# Patient Record
Sex: FEMALE | Race: OTHER | Employment: FULL TIME | URBAN - METROPOLITAN AREA
[De-identification: names, ages, dates, MRNs, and addresses within clinical notes are randomized per-mention and may not be internally consistent; named-entity substitution may affect disease eponyms.]

---

## 2017-10-20 ENCOUNTER — OFFICE VISIT (OUTPATIENT)
Dept: INTERNAL MEDICINE CLINIC | Age: 32
End: 2017-10-20

## 2017-10-20 VITALS
WEIGHT: 123 LBS | RESPIRATION RATE: 14 BRPM | DIASTOLIC BLOOD PRESSURE: 62 MMHG | HEART RATE: 77 BPM | TEMPERATURE: 99.6 F | OXYGEN SATURATION: 97 % | HEIGHT: 61 IN | BODY MASS INDEX: 23.22 KG/M2 | SYSTOLIC BLOOD PRESSURE: 116 MMHG

## 2017-10-20 DIAGNOSIS — Z01.411 ABNORMAL PELVIC EXAM: Primary | ICD-10-CM

## 2017-10-20 DIAGNOSIS — Z23 ENCOUNTER FOR IMMUNIZATION: ICD-10-CM

## 2017-10-20 DIAGNOSIS — B37.31 VULVOVAGINAL CANDIDIASIS: ICD-10-CM

## 2017-10-20 RX ORDER — FLUCONAZOLE 150 MG/1
150 TABLET ORAL DAILY
Qty: 1 TAB | Refills: 0 | Status: SHIPPED | OUTPATIENT
Start: 2017-10-20 | End: 2017-10-21

## 2017-10-20 NOTE — PATIENT INSTRUCTIONS
Vaccine Information Statement    Influenza (Flu) Vaccine (Inactivated or Recombinant): What you need to know    Many Vaccine Information Statements are available in Vietnamese and other languages. See www.immunize.org/vis  Hojas de Información Sobre Vacunas están disponibles en Español y en muchos otros idiomas. Visite www.immunize.org/vis    1. Why get vaccinated? Influenza (flu) is a contagious disease that spreads around the [place] every year, usually between October and May. Flu is caused by influenza viruses, and is spread mainly by coughing, sneezing, and close contact. Anyone can get flu. Flu strikes suddenly and can last several days. Symptoms vary by age, but can include:   fever/chills   sore throat   muscle aches   fatigue   cough   headache    runny or stuffy nose    Flu can also lead to pneumonia and blood infections, and cause diarrhea and seizures in children. If you have a medical condition, such as heart or lung disease, flu can make it worse. Flu is more dangerous for some people. Infants and young children, people 72years of age and older, pregnant women, and people with certain health conditions or a weakened immune system are at greatest risk. Each year thousands of people in the [facility] die from flu, and many more are hospitalized. Flu vaccine can:   keep you from getting flu,   make flu less severe if you do get it, and   keep you from spreading flu to your family and other people. 2. Inactivated and recombinant flu vaccines    A dose of flu vaccine is recommended every flu season. Children 6 months through 6years of age may need two doses during the same flu season. Everyone else needs only one dose each flu season.        Some inactivated flu vaccines contain a very small amount of a mercury-based preservative called thimerosal. Studies have not shown thimerosal in vaccines to be harmful, but flu vaccines that do not contain thimerosal are available. There is no live flu virus in flu shots. They cannot cause the flu. There are many flu viruses, and they are always changing. Each year a new flu vaccine is made to protect against three or four viruses that are likely to cause disease in the upcoming flu season. But even when the vaccine doesnt exactly match these viruses, it may still provide some protection    Flu vaccine cannot prevent:   flu that is caused by a virus not covered by the vaccine, or   illnesses that look like flu but are not. It takes about 2 weeks for protection to develop after vaccination, and protection lasts through the flu season. 3. Some people should not get this vaccine    Tell the person who is giving you the vaccine:     If you have any severe, life-threatening allergies. If you ever had a life-threatening allergic reaction after a dose of flu vaccine, or have a severe allergy to any part of this vaccine, you may be advised not to get vaccinated. Most, but not all, types of flu vaccine contain a small amount of egg protein.  If you ever had Guillain-Barré Syndrome (also called GBS). Some people with a history of GBS should not get this vaccine. This should be discussed with your doctor.  If you are not feeling well. It is usually okay to get flu vaccine when you have a mild illness, but you might be asked to come back when you feel better. 4. Risks of a vaccine reaction    With any medicine, including vaccines, there is a chance of reactions. These are usually mild and go away on their own, but serious reactions are also possible. Most people who get a flu shot do not have any problems with it.      Minor problems following a flu shot include:    soreness, redness, or swelling where the shot was given     hoarseness   sore, red or itchy eyes   cough   fever   aches   headache   itching   fatigue  If these problems occur, they usually begin soon after the shot and last 1 or 2 days. More serious problems following a flu shot can include the following:     There may be a small increased risk of Guillain-Barré Syndrome (GBS) after inactivated flu vaccine. This risk has been estimated at 1 or 2 additional cases per million people vaccinated. This is much lower than the risk of severe complications from flu, which can be prevented by flu vaccine.  Young children who get the flu shot along with pneumococcal vaccine (PCV13) and/or DTaP vaccine at the same time might be slightly more likely to have a seizure caused by fever. Ask your doctor for more information. Tell your doctor if a child who is getting flu vaccine has ever had a seizure. Problems that could happen after any injected vaccine:      People sometimes faint after a medical procedure, including vaccination. Sitting or lying down for about 15 minutes can help prevent fainting, and injuries caused by a fall. Tell your doctor if you feel dizzy, or have vision changes or ringing in the ears.  Some people get severe pain in the shoulder and have difficulty moving the arm where a shot was given. This happens very rarely.  Any medication can cause a severe allergic reaction. Such reactions from a vaccine are very rare, estimated at about 1 in a million doses, and would happen within a few minutes to a few hours after the vaccination. As with any medicine, there is a very remote chance of a vaccine causing a serious injury or death. The safety of vaccines is always being monitored. For more information, visit: www.cdc.gov/vaccinesafety/    5. What if there is a serious reaction? What should I look for?  Look for anything that concerns you, such as signs of a severe allergic reaction, very high fever, or unusual behavior.     Signs of a severe allergic reaction can include hives, swelling of the face and throat, difficulty breathing, a fast heartbeat, dizziness, and weakness - usually within a few minutes to a few hours after the vaccination. What should I do?  If you think it is a severe allergic reaction or other emergency that cant wait, call 9-1-1 and get the person to the nearest hospital. Otherwise, call your doctor.  Reactions should be reported to the Vaccine Adverse Event Reporting System (VAERS). Your doctor should file this report, or you can do it yourself through  the VAERS web site at www.vaers. [facility].gov, or by calling 7-949.224.8461. VAERS does not give medical advice. 6. The National Vaccine Injury Compensation Program    The [facility] Vaccine Injury Compensation Program (VICP) is a federal program that was created to compensate people who may have been injured by certain vaccines. Persons who believe they may have been injured by a vaccine can learn about the program and about filing a claim by calling 0-140.674.3444 or visiting the [employer] website at www.[facility].gov/vaccinecompensation. There is a time limit to file a claim for compensation. 7. How can I learn more?  Ask your healthcare provider. He or she can give you the vaccine package insert or suggest other sources of information.  Call your local or state health department.  Contact the Centers for Disease Control and Prevention (CDC):  - Call 0-562.974.3409 (1-800-CDC-INFO) or  - Visit CDCs website at www.cdc.gov/flu    Vaccine Information Statement   Inactivated Influenza Vaccine   8/7/2015  [id number]    Department of Health and Human Services  Centers for Disease Control and Prevention    Office Use Only

## 2017-10-20 NOTE — PROGRESS NOTES
Patient's identity verified with two patient identifiers (name and date of birth). Patient speaks Jayda, no english. Blue phone  used today, Marty Mattson \"[id number]\"    1. Have you been to the ER, urgent care clinic since your last visit? Hospitalized since your last visit? No  2. Have you seen or consulted any other health care providers outside of the [address] since your last visit? Include any pap smears or colon screening. No     Chief Complaint   Patient presents with    New Patient     No PCP,  is a patient of Nicci Recio NP as well.  Establish Care     Recently moved from [address] [place] [address].  Vaginal Discharge     x1 yr, white in appearance, painful with intercourse. Not fasting. Health Maintenance Due   Topic Date Due    DTaP/Tdap/Td series (1 - Tdap)  Has had this during pregnancy she thinks. 11/20/2006    PAP AKA CERVICAL CYTOLOGY   Done today. 11/20/2006    INFLUENZA AGE 9 TO ADULT   Requesting today, consent received while  on phone.  08/01/2017       All screening questions were translated    Wt Readings from Last 3 Encounters:   10/20/17 123 lb (55.8 kg)     Temp Readings from Last 3 Encounters:   10/20/17 99.6 °F (37.6 °C) (Oral)     BP Readings from Last 3 Encounters:   10/20/17 116/62     Pulse Readings from Last 3 Encounters:   10/20/17 77       Learning Assessment:  :     Learning Assessment 10/20/2017   PRIMARY LEARNER Patient   HIGHEST LEVEL OF EDUCATION - PRIMARY LEARNER  DID NOT GRADUATE HIGH SCHOOL   BARRIERS PRIMARY LEARNER CULTURAL     LANGUAGE   CO-LEARNER CAREGIVER Yes   CO-LEARNER NAME TRANSLATER   PRIMARY LANGUAGE OTHER (COMMENT)   LEARNER PREFERENCE PRIMARY READING   ANSWERED BY patient   RELATIONSHIP SELF       Depression Screening:  :     PHQ over the last two weeks 10/20/2017   Little interest or pleasure in doing things Not at all   Feeling down, depressed or hopeless Not at all   Total Score PHQ 2 0       Fall Risk Assessment:  : Fall Risk Assessment, last 12 mths 10/20/2017   Able to walk? Yes   Fall in past 12 months? No       Abuse Screening:  :     Abuse Screening Questionnaire 10/20/2017   Do you ever feel afraid of your partner? N   Are you in a relationship with someone who physically or mentally threatens you? N   Is it safe for you to go home? Y       Patient is accompanied by spouse, Irene Officer and their two children under the age of 11, I have received verbal consent from [address] [place] [address] to discuss any/all medical information while they are present in the room. Patient opts for Flu Vaccine today in office, per receiving order from provider Phill Patel NP. All questions answered, consent form signed, and VIS given. 0.5 ML given in left  deltoid, IM route, without difficulty, patient tolerated well. Patient was monitored for 15 minutes after administration with no complications.     LOT: [id number]  EXP: 6/7/18  : [facility]  NDC: 39314-164-98  SITE: LEFT DELTOID  ROUTE: INTRAMUSCULAR

## 2017-10-20 NOTE — PROGRESS NOTES
Subjective:      Enid Cordova is a 32 y.o. female who presents today to establish care    No chronic medical conditions    Comes in today with her  and two children  Is Jayda speaking,  used- refer to nursing notes for  number    Vaginal Discharge:  X 1 year  Whitish discharge, foul odor  Painful intercourse  Unsure of ulcerations or lesions, has not looked  Has to change underwear throughout the day  bc of discharge  Sexually active with - Uses condoms  Interested in other birth control options, took OCP in the past but did not like- felt that she had mood changes   recently dx'd with HSV  No dysuria  Has never had a pelvic exam before      Health maintenance:   Last pap: none, never had  Last influenza vaccination: requesting today    There are no active problems to display for this patient. Review of Systems    Pertinent items are noted in HPI. Objective:     Visit Vitals    /62 (BP 1 Location: Left arm, BP Patient Position: Standing)    Pulse 77    Temp 99.6 °F (37.6 °C) (Oral)    Resp 14    Ht 5' 0.5\" (1.537 m)    Wt 123 lb (55.8 kg)    LMP 10/09/2017 (Exact Date)    SpO2 97%    BMI 23.63 kg/m2     General appearance: alert, cooperative, no distress, appears stated age  Head: Normocephalic, without obvious abnormality, atraumatic  Lungs: nonlabored, no cough  Heart: regular rate  Pelvic: External genitalia normal, no CMT, uterus normal size, shape, and consistency, no adnexal masses or tenderness, friable cervix, thick chunky white discharge in addition to yellow/green discharge  Extremities: extremities normal, atraumatic, no cyanosis or edema  Skin: Skin color, texture, turgor joaquin  Neurologic: Grossly normal    Assessment/Plan:     1. Abnormal pelvic exam  - referral to OBGYN- patient interested in IUD placement.   Our office called and helped schedule this appt for 10/30/17  - discussed birth control options with patient, wanting birth control that will last 10 years  - fluconazole (DIFLUCAN) 150 mg tablet; Take 1 Tab by mouth daily for 1 day. FDA advises cautious prescribing of oral fluconazole in pregnancy. Dispense: 1 Tab; Refill: 0  - NUSWAB VAGINITIS PLUS (VG+) WITH CANDIDA (SIX SPECIES)  - HSV 1/2 AB, IGM  - PAP IG, RFX HPV ASCU, 62&91,98([id number])  - UA/M W/RFLX CULTURE, ROUTINE    2. Vulvovaginal candidiasis  - fluconazole (DIFLUCAN) 150 mg tablet; Take 1 Tab by mouth daily for 1 day. FDA advises cautious prescribing of oral fluconazole in pregnancy. Dispense: 1 Tab; Refill: 0  - pt instructed to let me know if does not resolve with diflucan once    3. Encounter for immunization  - Influenza virus vaccine (QUADRIVALENT PRES FREE SYRINGE) IM (78996)  - AK IMMUNIZ ADMIN,1 SINGLE/COMB VAC/TOXOID     Greater than 50% of this visit was spent in counseling the patient about birth control, vaginal discharge,  use      Advised her to call back or return to office if symptoms worsen/change/persist.  Discussed expected course/resolution/complications of diagnosis in detail with patient. Medication risks/benefits/costs/interactions/alternatives discussed with patient. She was given an after visit summary which includes diagnoses, current medications, & vitals. She expressed understanding with the diagnosis and plan.     JACQUES Jackson

## 2017-10-20 NOTE — MR AVS SNAPSHOT
Visit Information Date & Time Provider Department Dept. Phone Encounter #  
 10/20/2017  9:30 AM ALBINO Gomez  [facility] [phone] [id number] Follow-up Instructions Return in about 3 months (around 1/20/2018) for CPE. Your Appointments 10/30/2017  2:20 PM  
New Patient with Yun Hung, NP  
[facility] [address] [place] [address] ([address]) Appt Note: NG/discuss bc options/needs  phone/m  
 [address] [facility] [address]  
[facility], [address] [facility] 57 Upcoming Health Maintenance Date Due DTaP/Tdap/Td series (1 - Tdap) 11/20/2006 PAP AKA CERVICAL CYTOLOGY 11/20/2006 INFLUENZA AGE 9 TO ADULT 8/1/2017 Allergies as of 10/20/2017  Review Complete On: 10/20/2017 By: Priscilla Cuellar NP No Known Allergies Current Immunizations  Never Reviewed Name Date Influenza Vaccine (Quad) PF  Incomplete Not reviewed this visit You Were Diagnosed With   
  
 Codes Comments Abnormal pelvic exam    -  Primary ICD-10-CM: Y35.166 ICD-9-CM: 793.5 Vulvovaginal candidiasis     ICD-10-CM: B37.3 ICD-9-CM: 112.1 Encounter for immunization     ICD-10-CM: A07 ICD-9-CM: V03.89 Vitals BP Pulse Temp Resp Height(growth percentile) Weight(growth percentile) 116/62 (BP 1 Location: Left arm, BP Patient Position: Standing) 77 99.6 °F (37.6 °C) (Oral) 14 5' 0.5\" (1.537 m) 123 lb (55.8 kg) LMP SpO2 BMI OB Status Smoking Status 10/09/2017 (Exact Date) 97% 23.63 kg/m2 Having regular periods Never Smoker Vitals History BMI and BSA Data Body Mass Index Body Surface Area  
 23.63 kg/m 2 1.54 m 2 Preferred Pharmacy Pharmacy Name Phone [facility] [address] [address] Your Updated Medication List  
  
   
 This list is accurate as of: 10/20/17  9:41 AM.  Always use your most recent med list.  
  
  
  
  
 fluconazole 150 mg tablet Commonly known as:  DIFLUCAN Take 1 Tab by mouth daily for 1 day. FDA advises cautious prescribing of oral fluconazole in pregnancy. Prescriptions Sent to Pharmacy Refills  
 fluconazole (DIFLUCAN) 150 mg tablet 0 Sig: Take 1 Tab by mouth daily for 1 day. FDA advises cautious prescribing of oral fluconazole in pregnancy. Class: Normal  
 Pharmacy: [address], [facility] [place] [facility] #: [phone] Route: Oral  
  
We Performed the Following HSV 1/2 AB, IGM [id number] CPT(R)] INFLUENZA VIRUS VAC QUAD,SPLIT,PRESV FREE SYRINGE IM [id number] CPT(R)] NUSWAB VAGINITIS PLUS (VG+) WITH CANDIDA (SIX SPECIES) [PRINTER FRILUCIANA Custom] PAP IG, [address] HPV ASCU, [id number]) [[id number] Custom] AK IMMUNIZ ADMIN,1 SINGLE/COMB VAC/TOXOID [id number] CPT(R)] UA/M W/RFLX CULTURE, ROUTINE [[id number] Custom] Follow-up Instructions Return in about 3 months (around 1/20/2018) for CPE. Patient Instructions Vaccine Information Statement Influenza (Flu) Vaccine (Inactivated or Recombinant): What you need to know Many Vaccine Information Statements are available in Hungarian and other languages. See www.immunize.org/vis Hojas de Información Sobre Vacunas están disponibles en Español y en muchos otros idiomas. Visite www.immunize.org/vis 1. Why get vaccinated? Influenza (flu) is a contagious disease that spreads around the [place] every year, usually between October and May. Flu is caused by influenza viruses, and is spread mainly by coughing, sneezing, and close contact. Anyone can get flu. Flu strikes suddenly and can last several days. Symptoms vary by age, but can include: 
 fever/chills  sore throat  muscle aches  fatigue  cough  headache  runny or stuffy nose Flu can also lead to pneumonia and blood infections, and cause diarrhea and seizures in children. If you have a medical condition, such as heart or lung disease, flu can make it worse. Flu is more dangerous for some people. Infants and young children, people 72years of age and older, pregnant women, and people with certain health conditions or a weakened immune system are at greatest risk. Each year thousands of people in the [facility] die from flu, and many more are hospitalized. Flu vaccine can: 
 keep you from getting flu, 
 make flu less severe if you do get it, and 
 keep you from spreading flu to your family and other people. 2. Inactivated and recombinant flu vaccines A dose of flu vaccine is recommended every flu season. Children 6 months through 6years of age may need two doses during the same flu season. Everyone else needs only one dose each flu season. Some inactivated flu vaccines contain a very small amount of a mercury-based preservative called thimerosal. Studies have not shown thimerosal in vaccines to be harmful, but flu vaccines that do not contain thimerosal are available. There is no live flu virus in flu shots. They cannot cause the flu. There are many flu viruses, and they are always changing. Each year a new flu vaccine is made to protect against three or four viruses that are likely to cause disease in the upcoming flu season. But even when the vaccine doesnt exactly match these viruses, it may still provide some protection Flu vaccine cannot prevent: 
 flu that is caused by a virus not covered by the vaccine, or 
 illnesses that look like flu but are not. It takes about 2 weeks for protection to develop after vaccination, and protection lasts through the flu season. 3. Some people should not get this vaccine Tell the person who is giving you the vaccine:  If you have any severe, life-threatening allergies. If you ever had a life-threatening allergic reaction after a dose of flu vaccine, or have a severe allergy to any part of this vaccine, you may be advised not to get vaccinated. Most, but not all, types of flu vaccine contain a small amount of egg protein.  If you ever had Guillain-Barré Syndrome (also called GBS). Some people with a history of GBS should not get this vaccine. This should be discussed with your doctor.  If you are not feeling well. It is usually okay to get flu vaccine when you have a mild illness, but you might be asked to come back when you feel better. 4. Risks of a vaccine reaction With any medicine, including vaccines, there is a chance of reactions. These are usually mild and go away on their own, but serious reactions are also possible. Most people who get a flu shot do not have any problems with it. Minor problems following a flu shot include:  
 soreness, redness, or swelling where the shot was given  hoarseness  sore, red or itchy eyes  cough  fever  aches  headache  itching  fatigue If these problems occur, they usually begin soon after the shot and last 1 or 2 days. More serious problems following a flu shot can include the following:  There may be a small increased risk of Guillain-Barré Syndrome (GBS) after inactivated flu vaccine. This risk has been estimated at 1 or 2 additional cases per million people vaccinated. This is much lower than the risk of severe complications from flu, which can be prevented by flu vaccine.  Young children who get the flu shot along with pneumococcal vaccine (PCV13) and/or DTaP vaccine at the same time might be slightly more likely to have a seizure caused by fever. Ask your doctor for more information. Tell your doctor if a child who is getting flu vaccine has ever had a seizure. Problems that could happen after any injected vaccine:  People sometimes faint after a medical procedure, including vaccination. Sitting or lying down for about 15 minutes can help prevent fainting, and injuries caused by a fall. Tell your doctor if you feel dizzy, or have vision changes or ringing in the ears.  Some people get severe pain in the shoulder and have difficulty moving the arm where a shot was given. This happens very rarely.  Any medication can cause a severe allergic reaction. Such reactions from a vaccine are very rare, estimated at about 1 in a million doses, and would happen within a few minutes to a few hours after the vaccination. As with any medicine, there is a very remote chance of a vaccine causing a serious injury or death. The safety of vaccines is always being monitored. For more information, visit: www.cdc.gov/vaccinesafety/ 
 
 
The [facility] Vaccine Injury Compensation Program (VICP) is a federal program that was created to compensate people who may have been injured by certain vaccines. Persons who believe they may have been injured by a vaccine can learn about the program and about filing a claim by calling 2-130.294.8315 or visiting the [employer] website at www.[facility].gov/vaccinecompensation. There is a time limit to file a claim for compensation. 7. How can I learn more?  Ask your healthcare provider. He or she can give you the vaccine package insert or suggest other sources of information.  Call your local or state health department.  Contact the Centers for Disease Control and Prevention (CDC): 
- Call 7-573.672.2756 (7-775-JUL-INFO) or 
- Visit CDCs website at www.cdc.gov/flu Vaccine Information Statement Inactivated Influenza Vaccine 8/7/2015 
[address] [id number] Replaced by [facility] and [employer] Centers for Disease Control and Prevention Office Use Only Introducing \[facility] [place] [facility]\"" & HEALTH SERVICES! Óscar Seo introduces [employer] patient portal. Now you can access parts of your medical record, email your doctor's office, and request medication refills online. 1. In your internet browser, go to [email or web address] 2. Click on the First Time User? Click Here link in the Sign In box. You will see the New Member Sign Up page. 3. Enter your [employer] Access Code exactly as it appears below. You will not need to use this code after youve completed the sign-up process. If you do not sign up before the expiration date, you must request a new code. · [employer] Access Code: [email or web address] Expires: 1/18/2018  9:41 AM 
 
4. Enter the last four digits of your Social Security Number (xxxx) and Date of Birth (mm/dd/yyyy) as indicated and click Submit. You will be taken to the next sign-up page. 5. Create a [employer] ID. This will be your [employer] login ID and cannot be changed, so think of one that is secure and easy to remember. 6. Create a [employer] password. You can change your password at any time. 7. Enter your Password Reset Question and Answer. This can be used at a later time if you forget your password. 8. Enter your e-mail address. You will receive e-mail notification when new information is available in [address] 9. Click Sign Up. You can now view and download portions of your medical record. 10. Click the Download Summary menu link to download a portable copy of your medical information. If you have questions, please visit the Frequently Asked Questions section of the [employer] website. Remember, [employer] is NOT to be used for urgent needs. For medical emergencies, dial 911. Now available from your iPhone and Android! Please provide this summary of care documentation to your next provider. Your primary care clinician is listed as Zulema Gagnon. If you have any questions after today's visit, please call [phone].

## 2017-10-23 LAB — HSV SPEC CULT: NORMAL

## 2017-10-25 LAB
CYTOLOGIST CVX/VAG CYTO: NORMAL
CYTOLOGY CVX/VAG DOC THIN PREP: NORMAL
DX ICD CODE: NORMAL
DX ICD CODE: NORMAL
LABCORP, 190119: NORMAL
Lab: NORMAL
OTHER STN SPEC: NORMAL
PATH REPORT.FINAL DX SPEC: NORMAL
STAT OF ADQ CVX/VAG CYTO-IMP: NORMAL

## 2017-10-28 LAB
A VAGINAE DNA VAG QL NAA+PROBE: ABNORMAL SCORE
BVAB2 DNA VAG QL NAA+PROBE: ABNORMAL SCORE
C ALBICANS DNA VAG QL NAA+PROBE: POSITIVE
C GLABRATA DNA VAG QL NAA+PROBE: NEGATIVE
C KRUSEI DNA VAG QL NAA+PROBE: NEGATIVE
C LUSITANIAE DNA VAG QL NAA+PROBE: NEGATIVE
C PARAP DNA VAG QL NAA+PROBE: NEGATIVE
C TRACH RRNA SPEC QL NAA+PROBE: NEGATIVE
C TROPICLS DNA VAG QL NAA+PROBE: NEGATIVE
GLUCOSE UR QL: NORMAL
KETONES UR QL STRIP: NORMAL
MEGA1 DNA VAG QL NAA+PROBE: ABNORMAL SCORE
N GONORRHOEA RRNA SPEC QL NAA+PROBE: NEGATIVE
PH UR STRIP: NORMAL [PH]
PROT UR QL STRIP: NORMAL
SP GR UR: NORMAL
T VAGINALIS RRNA SPEC QL NAA+PROBE: NEGATIVE

## 2017-11-02 ENCOUNTER — OFFICE VISIT (OUTPATIENT)
Dept: OBGYN CLINIC | Age: 32
End: 2017-11-02

## 2017-11-02 VITALS
BODY MASS INDEX: 23.3 KG/M2 | HEIGHT: 61 IN | DIASTOLIC BLOOD PRESSURE: 72 MMHG | WEIGHT: 123.4 LBS | SYSTOLIC BLOOD PRESSURE: 112 MMHG

## 2017-11-02 DIAGNOSIS — Z30.09 CONTRACEPTIVE EDUCATION: ICD-10-CM

## 2017-11-02 DIAGNOSIS — N89.8 VAGINAL DISCHARGE: Primary | ICD-10-CM

## 2017-11-02 DIAGNOSIS — N89.8 VAGINAL ITCHING: ICD-10-CM

## 2017-11-02 NOTE — PROGRESS NOTES
Chief Complaint   Family Planning (Discuss iud)      HPI  Adolfo Waggoner is a 32 y.o. female who presents to discuss iud options. Patient's last menstrual period was 10/09/2017 (exact date). Comes in today with her  and child. Is Jayda speaking,  used. Sexually active with - Uses condoms  Interested in IUD, took OCP in the past but did not like them    Was seen at pcp for vaginal itching, white discharge and burning with intercourse. Was given Diflucan x 1. Symptoms have improved, however she still has increased d/c she is concerned about. No past medical history on file. Past Surgical History:   Procedure Laterality Date    HX GYN      x3 vaginal deliveries     Social History     Occupational History    Not on file.      Social History Main Topics    Smoking status: Never Smoker    Smokeless tobacco: Never Used    Alcohol use No    Drug use: No    Sexual activity: Yes     Partners: Male     Birth control/ protection: None     Family History   Problem Relation Age of Onset    No Known Problems Mother     No Known Problems Father        No Known Allergies  Prior to Admission medications    Not on File        Review of Systems: History obtained from the patient  Constitutional: negative for weight loss, fever, night sweats  Breast: negative for breast lumps, nipple discharge, galactorrhea  GI: negative for change in bowel habits, abdominal pain, black or bloody stools  : negative for frequency, dysuria, hematuria, has increased vaginal discharge with out inflammatory symptoms  MSK: negative for back pain, joint pain, muscle pain  Skin: negative for itching, rash, hives  Psych: negative for anxiety, depression, change in mood      Objective:  Visit Vitals    /72    Ht 5' 0.5\" (1.537 m)    Wt 123 lb 6.4 oz (56 kg)    LMP 10/09/2017 (Exact Date)    BMI 23.7 kg/m2       Physical Exam:   PHYSICAL EXAMINATION    Constitutional  · Appearance: well-nourished, well developed, alert, in no acute distres    Genitourinary  · External Genitalia: normal appearance for age, moderate amount mucoid discharge present, no tenderness present, no inflammatory lesions present, no masses present, no atrophy present  · Vagina: normal vaginal vault without central or paravaginal defects, no discharge present, no inflammatory lesions present, no masses present  · Bladder: non-tender to palpation  · Urethra: appears normal  · Cervix: normal   · Uterus: normal size, shape and consistency  · Adnexa: no adnexal tenderness present, no adnexal masses present  · Perineum: perineum within normal limits, no evidence of trauma, no rashes or skin lesions present  · Anus: anus within normal limits, no hemorrhoids present  · Inguinal Lymph Nodes: no lymphadenopathy present    Skin  · General Inspection: no rash, no lesions identified    Neurologic/Psychiatric  · Mental Status:  · Orientation: grossly oriented to person, place and time  · Mood and Affect: mood normal, affect appropriate    Assessment/Plan:  31 yo  contraceptive management - desires IUD  -SANDY for yeast per pt request / [facility] prior to placement of IUD as pt is not a good historian and language barrier is difficult to ascertain if pt has any concerns for STDs- pt states through  she wants to be checked for \"infection\" before IUD- rx pending   -Reviewed options of IUD - pt would like kyleena- apt to be made today for  retrun visit and placement, recommend abstinence until placement and results to ensure pt is not pregnant and no new infections.    -    Va Gasca CNM

## 2017-11-05 LAB
A VAGINAE DNA VAG QL NAA+PROBE: NORMAL SCORE
BVAB2 DNA VAG QL NAA+PROBE: NORMAL SCORE
C ALBICANS DNA VAG QL NAA+PROBE: NEGATIVE
C GLABRATA DNA VAG QL NAA+PROBE: NEGATIVE
C TRACH RRNA SPEC QL NAA+PROBE: NEGATIVE
MEGA1 DNA VAG QL NAA+PROBE: NORMAL SCORE
N GONORRHOEA RRNA SPEC QL NAA+PROBE: NEGATIVE
T VAGINALIS RRNA SPEC QL NAA+PROBE: NEGATIVE

## 2017-11-13 ENCOUNTER — OFFICE VISIT (OUTPATIENT)
Dept: OBGYN CLINIC | Age: 32
End: 2017-11-13

## 2017-11-13 VITALS
DIASTOLIC BLOOD PRESSURE: 76 MMHG | HEIGHT: 60 IN | SYSTOLIC BLOOD PRESSURE: 114 MMHG | BODY MASS INDEX: 24.15 KG/M2 | WEIGHT: 123 LBS

## 2017-11-13 DIAGNOSIS — Z30.430 ENCOUNTER FOR IUD INSERTION: Primary | ICD-10-CM

## 2017-11-13 LAB
HCG URINE, QL. (POC): NEGATIVE
VALID INTERNAL CONTROL?: YES

## 2017-11-13 RX ORDER — IBUPROFEN 800 MG/1
800 TABLET ORAL
Qty: 60 TAB | Refills: 0 | Status: SHIPPED | OUTPATIENT
Start: 2017-11-13 | End: 2018-05-03

## 2017-11-13 NOTE — PROGRESS NOTES
-----------------------------------IUD INSERTION----------------------------------------- Indications:  Matthew Welch is a ,  32 y.o. female PATIENT REFUSED whose Patient's last menstrual period was 2017 (exact date). was on 2017 and was normal in duration and amount of flow. who presents for insertion of an IUD. The risks, benefits and alternatives of IUD insertion were discussed in detail at last visit. She also has reviewed Mirena information. She has elected to proceed with the insertion today and she states she has no further questions. A urine pregnancy test was negative No components found for: SPEP, UPEP    Procedure: The pelvic exam revealed normal external genitalia. On bimanual exam the uterus was anteverted and normal in size with no tenderness present. A speculum was inserted into the vagina and the cervix was visualized. The cervix was prepped with zephiran solution. The anterior lip of the cervix was grasped with a single toothed tenaculum. The uterus was sounded with a Whyte sound to 7 centimeters. A Mirena was then inserted without difficulty. The string was cut to 3 centimeters. She experienced a mild  amount of cramping. Post Procedure Status: She tolerated the procedure with mild discomfort. The patient was observed for 10 minutes after the insertion. There were no complications. Patient was discharged in stable condition. The patient received Mirena lot number.     [facility]  OFFICE PROCEDURE PROGRESS NOTE        Chart reviewed for the following:   Darren CUNNINGHAM LPN, have reviewed the History, Physical and updated the Allergic reactions for Lizet Bee Lidianicoleimi     TIME OUT performed immediately prior to start of procedure:   Jeannie Reynolds LPN, have performed the following reviews on Matthew Welch prior to the start of the procedure:            * Patient was identified by name and date of birth   * Agreement on procedure being performed was verified  * Risks and Benefits explained to the patient  * Procedure site verified and marked as necessary  * Patient was positioned for comfort  * Consent was signed and verified     Time: prior to procedure      Date of procedure: 11/13/2017    Procedure performed by:  Nenita Berrios CNM      How tolerated by patient: tolerated the procedure well with no complications    Post Procedural Pain Scale: 2 - Hurts Little Bit    Comments: none  Nenita Berrios CNM

## 2017-12-19 ENCOUNTER — ROUTINE PRENATAL (OUTPATIENT)
Dept: OBGYN CLINIC | Age: 32
End: 2017-12-19

## 2017-12-19 VITALS
SYSTOLIC BLOOD PRESSURE: 104 MMHG | BODY MASS INDEX: 24.35 KG/M2 | DIASTOLIC BLOOD PRESSURE: 68 MMHG | WEIGHT: 124 LBS | HEIGHT: 60 IN

## 2017-12-19 DIAGNOSIS — Z30.431 IUD CHECK UP: Primary | ICD-10-CM

## 2017-12-19 NOTE — PROGRESS NOTES
This is a follow-up visit for Homero Anne is a ,  28 y.o. female PATIENT REFUSED whose No LMP recorded. was on . She had an Mirena IUD placed six weeks ago. Since the IUD placement, the patient has not had any unusual complaints. She has had some mild non-menstrual bleeding. She reports vaginal bleeding which has occurred off and on since insertion of the Mirena. She denies any significant pelvic pain. She has had no fever. Associated signs and symptoms: she denies dyspareunia, expulsion, heavy bleeding, increased pain, fever, and pelvic pain. No past medical history on file. Past Surgical History:   Procedure Laterality Date    HX GYN      x3 vaginal deliveries     Social History     Occupational History    Not on file. Social History Main Topics    Smoking status: Never Smoker    Smokeless tobacco: Never Used    Alcohol use No    Drug use: No    Sexual activity: Yes     Partners: Male     Birth control/ protection: None     Family History   Problem Relation Age of Onset    No Known Problems Mother     No Known Problems Father        No Known Allergies  Prior to Admission medications    Medication Sig Start Date End Date Taking? Authorizing Provider   ibuprofen (MOTRIN) 800 mg tablet Take 1 Tab by mouth every eight (8) hours as needed for Pain. 17   Missael Brown, RAPHAELM        Review of Systems: History obtained from the patient  Constitutional: negative for weight loss, fever, night sweats  Breast: negative for breast lumps, nipple discharge, galactorrhea  GI: negative for change in bowel habits, abdominal pain, black or bloody stools  : negative for frequency, dysuria, hematuria, vaginal discharge  MSK: negative for back pain, joint pain, muscle pain  Skin: negative for itching, rash, hives  Psych: negative for anxiety, depression, change in mood      Objective: There were no vitals taken for this visit.     Physical Exam:   PHYSICAL EXAMINATION    Constitutional  · Appearance: well-nourished, well developed, alert, in no acute distress    Genitourinary  · External Genitalia: normal appearance for age, no discharge present, no tenderness present, no inflammatory lesions present, no masses present, no atrophy present  · Vagina: normal vaginal vault without central or paravaginal defects, no discharge present, no inflammatory lesions present, no masses present  · Bladder: non-tender to palpation  · Urethra: appears normal  · Cervix: normal with IUD string visible and appropriate length   · Uterus: normal size, shape and consistency  · Adnexa: no adnexal tenderness present, no adnexal masses present  · Perineum: perineum within normal limits, no evidence of trauma, no rashes or skin lesions present    Skin  · General Inspection: no rash, no lesions identified    Neurologic/Psychiatric  · Mental Status:  · Orientation: grossly oriented to person, place and time  · Mood and Affect: mood normal, affect appropriate    Assessment/Plan:   IUD check RTO 1 year     Serena Sykes CNM

## 2018-01-29 ENCOUNTER — OFFICE VISIT (OUTPATIENT)
Dept: INTERNAL MEDICINE CLINIC | Age: 33
End: 2018-01-29

## 2018-01-29 VITALS
WEIGHT: 121 LBS | SYSTOLIC BLOOD PRESSURE: 118 MMHG | TEMPERATURE: 99 F | OXYGEN SATURATION: 98 % | HEIGHT: 60 IN | HEART RATE: 77 BPM | BODY MASS INDEX: 23.75 KG/M2 | DIASTOLIC BLOOD PRESSURE: 68 MMHG | RESPIRATION RATE: 16 BRPM

## 2018-01-29 DIAGNOSIS — B37.31 VAGINAL YEAST INFECTION: ICD-10-CM

## 2018-01-29 DIAGNOSIS — Z00.00 ROUTINE GENERAL MEDICAL EXAMINATION AT A HEALTH CARE FACILITY: Primary | ICD-10-CM

## 2018-01-29 RX ORDER — FLUCONAZOLE 150 MG/1
150 TABLET ORAL DAILY
Qty: 1 TAB | Refills: 0 | Status: SHIPPED | OUTPATIENT
Start: 2018-01-29 | End: 2018-01-30

## 2018-01-29 NOTE — PROGRESS NOTES
Subjective:      Buddy Murdock is a 28 y.o. female who presents today for CPE    Jayda speaking,  # [id number]    Since our last appt has had IUD placed with Dr. Charla Dailey  IUD doing well, has spotting still    Has small amt burning and pain, only with intercourse  + vaginal discharge  At last visit, dx'd with vaginal yeast infection, took diflucan capsule and improved  Vaginal discharge returned again 2-3 weeks ago, white chunky  No dysuria or hematuria  No fevers or chills  No abdominal pain    No chronic medical conditions    Denies any chest pain, palpitaitons, shortness of breath, abdominal pain, bowel changes, difficulty urinating, headaches, or dizziness      There are no active problems to display for this patient. Current Outpatient Prescriptions   Medication Sig Dispense Refill    levonorgestrel (MIRENA) 20 mcg/24 hr (5 years) IUD 1 Device by IntraUTERine route once.  ibuprofen (MOTRIN) 800 mg tablet Take 1 Tab by mouth every eight (8) hours as needed for Pain. 60 Tab 0        Review of Systems    Pertinent items are noted in HPI. Objective:     Visit Vitals    /68 (BP 1 Location: Left arm, BP Patient Position: Sitting)    Pulse 77    Temp 99 °F (37.2 °C) (Oral)    Resp 16    Ht 5' (1.524 m)    Wt 121 lb (54.9 kg)    SpO2 98%    BMI 23.63 kg/m2     General:  Alert, cooperative, no distress, appears stated age. Head:  Normocephalic, without obvious abnormality, atraumatic. Eyes:  Conjunctivae/corneas clear. PERRL, EOMs intact   Ears:  Normal TMs and external ear canals both ears. Nose: Nares normal. Septum midline. Mucosa normal   Throat: Lips, mucosa, and tongue normal. Teeth and gums normal.   Neck: Supple, symmetrical, trachea midline, no adenopathy, thyroid: no enlargement/tenderness/nodules, no JVD. Back:   Symmetric, no curvature. ROM normal. No CVA tenderness. Lungs:   Clear to auscultation bilaterally. Chest wall:  No tenderness or deformity.    Heart: Regular rate and rhythm, S1, S2 normal, no murmur, click, rub or gallop. Abdomen:   Soft, non-tender. Bowel sounds normal. No masses,  No organomegaly. Genitalia:  Normal female without lesion, no CMT. + small amt vaginal bleeding, + small amt white chunky discharge . IUD strings visualized   Extremities: Extremities normal, atraumatic, no cyanosis or edema. Steady gait   Pulses: 2+ and symmetric all extremities. Skin: Skin color, texture, turgor normal. No rashes or lesions. Lymph nodes: Cervical, supraclavicular nodes normal.   Neurologic: CNII-XII intact. Normal strength, sensation throughout. Assessment/Plan:     1. Routine general medical examination at a health care facility  - METABOLIC PANEL, COMPREHENSIVE  - CBC WITH AUTOMATED DIFF  - TSH RFX ON ABNORMAL TO FREE T4  - VITAMIN D, 25 HYDROXY    2. Vaginal yeast infection  - fluconazole (DIFLUCAN) 150 mg tablet; Take 1 Tab by mouth daily for 1 day. FDA advises cautious prescribing of oral fluconazole in pregnancy. Dispense: 1 Tab; Refill: 0      Advised her to call back or return to office if symptoms worsen/change/persist.  Discussed expected course/resolution/complications of diagnosis in detail with patient. Medication risks/benefits/costs/interactions/alternatives discussed with patient. She was given an after visit summary which includes diagnoses, current medications, & vitals. She expressed understanding with the diagnosis and plan.     JACQUES Lewis

## 2018-01-29 NOTE — PROGRESS NOTES
Chief Complaint   Patient presents with    Complete Physical        1. Have you been to the ER, urgent care clinic since your last visit? No Hospitalized since your last visit? No    2. Have you seen or consulted any other health care providers outside of the [address] [place] [address] since your last visit? No  Include any pap smears or colon screening.  No

## 2018-01-29 NOTE — MR AVS SNAPSHOT
[address] [facility], [address] [place] [address] 
[phone] Patient: Neil Pritchard MRN: [id number] Visit Information Date & Time Provider Department Dept. Phone Encounter #  
 1/29/2018  3:30 PM [facility] [id number] Follow-up Instructions Return in about 1 year (around 1/29/2019) for CPE. Upcoming Health Maintenance Date Due DTaP/Tdap/Td series (1 - Tdap) 11/20/2006 PAP AKA CERVICAL CYTOLOGY 10/20/2020 Allergies as of 1/29/2018  Review Complete On: 1/29/2018 By: Nilam Grijalva NP No Known Allergies Current Immunizations  Never Reviewed Name Date Influenza Vaccine (Quad) PF 10/20/2017 Not reviewed this visit You Were Diagnosed With   
  
 Codes Comments Routine general medical examination at a health care facility    -  Primary ICD-10-CM: Z00.00 ICD-9-CM: V70.0 Vaginal yeast infection     ICD-10-CM: B37.3 ICD-9-CM: 112.1 Vitals BP Pulse Temp Resp Height(growth percentile) Weight(growth percentile)  
 118/68 (BP 1 Location: Left arm, BP Patient Position: Sitting) 77 99 °F (37.2 °C) (Oral) 16 5' (1.524 m) 121 lb (54.9 kg) SpO2 BMI OB Status Smoking Status 98% 23.63 kg/m2 IUD Never Smoker Vitals History BMI and BSA Data Body Mass Index Body Surface Area  
 23.63 kg/m 2 1.52 m 2 Preferred Pharmacy Pharmacy Name Phone [facility]AC[id number] [phone] Your Updated Medication List  
  
   
This list is accurate as of: 1/29/18  4:03 PM.  Always use your most recent med list.  
  
  
  
  
 fluconazole 150 mg tablet Commonly known as:  DIFLUCAN Take 1 Tab by mouth daily for 1 day. FDA advises cautious prescribing of oral fluconazole in pregnancy. ibuprofen 800 mg tablet Commonly known as:  MOTRIN Take 1 Tab by mouth every eight (8) hours as needed for Pain. MIRENA 20 mcg/24 hr (5 years) Iud  
Generic drug:  levonorgestrel 1 Device by IntraUTERine route once. Prescriptions Sent to Pharmacy Refills  
 fluconazole (DIFLUCAN) 150 mg tablet 0 Sig: Take 1 Tab by mouth daily for 1 day. FDA advises cautious prescribing of oral fluconazole in pregnancy. Class: Normal  
 Pharmacy: [facility]acy [facility][id number] - [address] Ph #: [phone] Route: Oral  
  
We Performed the Following CBC WITH AUTOMATED DIFF [93592 CPT(R)] METABOLIC PANEL, COMPREHENSIVE [95750 CPT(R)] TSH RFX ON ABNORMAL TO FREE T4 [[id number] Custom] VITAMIN D, 25 HYDROXY [id number] CPT(R)] Follow-up Instructions Return in about 1 year (around 1/29/2019) for CPE. Introducing hospitals & HEALTH SERVICES! [employer] introduces [employer]Point patient portal. Now you can access parts of your medical record, email your doctor's office, and request medication refills online. 1. In your internet browser, go to [email or web address] 2. Click on the First Time User? Click Here link in the Sign In box. You will see the New Member Sign Up page. 3. Enter your MarginPoint Access Code exactly as it appears below. You will not need to use this code after youve completed the sign-up process. If you do not sign up before the expiration date, you must request a new code. · MarginPoint Access Code: [email or web address] Expires: 4/29/2018  4:03 PM 
 
4. Enter the last four digits of your Social Security Number (xxxx) and Date of Birth (mm/dd/yyyy) as indicated and click Submit. You will be taken to the next sign-up page. 5. Create a Balandrast ID. This will be your MarginPoint login ID and cannot be changed, so think of one that is secure and easy to remember. 6. Create a Balandrast password. You can change your password at any time. 7. Enter your Password Reset Question and Answer. This can be used at a later time if you forget your password. 8. Enter your e-mail address. You will receive e-mail notification when new information is available in [address] 9. Click Sign Up. You can now view and download portions of your medical record. 10. Click the Download Summary menu link to download a portable copy of your medical information. If you have questions, please visit the Frequently Asked Questions section of the [employer] website. Remember, [employer] is NOT to be used for urgent needs. For medical emergencies, dial 911. Now available from your iPhone and Android! Please provide this summary of care documentation to your next provider. Your primary care clinician is listed as Kathryn Cruz. If you have any questions after today's visit, please call [phone].

## 2018-01-30 LAB
25(OH)D3+25(OH)D2 SERPL-MCNC: 24.2 NG/ML (ref 30–100)
ALBUMIN SERPL-MCNC: 4.3 G/DL (ref 3.5–5.5)
ALBUMIN/GLOB SERPL: 1.5 {RATIO} (ref 1.2–2.2)
ALP SERPL-CCNC: 43 IU/L (ref 39–117)
ALT SERPL-CCNC: 23 IU/L (ref 0–32)
AST SERPL-CCNC: 18 IU/L (ref 0–40)
BASOPHILS # BLD AUTO: 0 X10E3/UL (ref 0–0.2)
BASOPHILS NFR BLD AUTO: 1 %
BILIRUB SERPL-MCNC: 0.5 MG/DL (ref 0–1.2)
BUN SERPL-MCNC: 16 MG/DL (ref 6–20)
BUN/CREAT SERPL: 30 (ref 9–23)
CALCIUM SERPL-MCNC: 9.4 MG/DL (ref 8.7–10.2)
CHLORIDE SERPL-SCNC: 101 MMOL/L (ref 96–106)
CO2 SERPL-SCNC: 26 MMOL/L (ref 18–29)
CREAT SERPL-MCNC: 0.53 MG/DL (ref 0.57–1)
EOSINOPHIL # BLD AUTO: 0.2 X10E3/UL (ref 0–0.4)
EOSINOPHIL NFR BLD AUTO: 4 %
ERYTHROCYTE [DISTWIDTH] IN BLOOD BY AUTOMATED COUNT: 12.8 % (ref 12.3–15.4)
GFR SERPLBLD CREATININE-BSD FMLA CKD-EPI: 126 ML/MIN/1.73
GFR SERPLBLD CREATININE-BSD FMLA CKD-EPI: 145 ML/MIN/1.73
GLOBULIN SER CALC-MCNC: 2.9 G/DL (ref 1.5–4.5)
GLUCOSE SERPL-MCNC: 88 MG/DL (ref 65–99)
HCT VFR BLD AUTO: 38.8 % (ref 34–46.6)
HGB BLD-MCNC: 13 G/DL (ref 11.1–15.9)
IMM GRANULOCYTES # BLD: 0 X10E3/UL (ref 0–0.1)
IMM GRANULOCYTES NFR BLD: 0 %
LYMPHOCYTES # BLD AUTO: 1.5 X10E3/UL (ref 0.7–3.1)
LYMPHOCYTES NFR BLD AUTO: 38 %
MCH RBC QN AUTO: 28.8 PG (ref 26.6–33)
MCHC RBC AUTO-ENTMCNC: 33.5 G/DL (ref 31.5–35.7)
MCV RBC AUTO: 86 FL (ref 79–97)
MONOCYTES # BLD AUTO: 0.2 X10E3/UL (ref 0.1–0.9)
MONOCYTES NFR BLD AUTO: 6 %
NEUTROPHILS # BLD AUTO: 2.1 X10E3/UL (ref 1.4–7)
NEUTROPHILS NFR BLD AUTO: 51 %
PLATELET # BLD AUTO: 246 X10E3/UL (ref 150–379)
POTASSIUM SERPL-SCNC: 3.8 MMOL/L (ref 3.5–5.2)
PROT SERPL-MCNC: 7.2 G/DL (ref 6–8.5)
RBC # BLD AUTO: 4.52 X10E6/UL (ref 3.77–5.28)
SODIUM SERPL-SCNC: 141 MMOL/L (ref 134–144)
TSH SERPL DL<=0.005 MIU/L-ACNC: 1.99 UIU/ML (ref 0.45–4.5)
WBC # BLD AUTO: 4 X10E3/UL (ref 3.4–10.8)

## 2018-05-03 ENCOUNTER — OFFICE VISIT (OUTPATIENT)
Dept: INTERNAL MEDICINE CLINIC | Age: 33
End: 2018-05-03

## 2018-05-03 VITALS
SYSTOLIC BLOOD PRESSURE: 102 MMHG | HEART RATE: 66 BPM | RESPIRATION RATE: 16 BRPM | TEMPERATURE: 99.4 F | DIASTOLIC BLOOD PRESSURE: 62 MMHG

## 2018-05-03 DIAGNOSIS — R10.9 ABDOMINAL PAIN, UNSPECIFIED ABDOMINAL LOCATION: Primary | ICD-10-CM

## 2018-05-03 RX ORDER — OMEPRAZOLE 20 MG/1
20 CAPSULE, DELAYED RELEASE ORAL DAILY
Qty: 30 CAP | Refills: 1 | Status: SHIPPED | OUTPATIENT
Start: 2018-05-03 | End: 2019-03-06

## 2018-05-03 NOTE — MR AVS SNAPSHOT
[address] [facility], [address] [place] [address] 
[phone] Patient: Caprice Burdick MRN: [id number] Visit Information Date & Time Provider Department Dept. Phone Encounter #  
 5/3/2018 10:40 AM ALBINO Marre[facility] [phone] Follow-up Instructions Return if symptoms worsen or fail to improve. Upcoming Health Maintenance Date Due Influenza Age 5 to Adult 8/1/2018 PAP AKA CERVICAL CYTOLOGY 10/20/2020 DTaP/Tdap/Td series (2 - Td) 7/6/2027 Allergies as of 5/3/2018  Review Complete On: 5/3/2018 By: Sharon Osei NP No Known Allergies Current Immunizations  Never Reviewed Name Date Influenza Vaccine (Quad) PF 10/20/2017 Not reviewed this visit You Were Diagnosed With   
  
 Codes Comments Abdominal pain, unspecified abdominal location    -  Primary ICD-10-CM: R10.9 ICD-9-CM: 789.00 Vitals BP Pulse Resp OB Status Smoking Status 102/62 77 16 IUD Never Smoker Preferred Pharmacy Pharmacy Name Phone [id number] [phone] Your Updated Medication List  
  
   
This list is accurate as of 5/3/18 10:50 AM.  Always use your most recent med list.  
  
  
  
  
 MIRENA 20 mcg/24 hr (5 years) Iud  
Generic drug:  levonorgestrel 1 Device by IntraUTERine route once. omeprazole 20 mg capsule Commonly known as:  PRILOSEC Take 1 Cap by mouth daily. Prescriptions Sent to Pharmacy Refills  
 omeprazole (PRILOSEC) 20 mg capsule 1 Sig: Take 1 Cap by mouth daily. Class: Normal  
 Pharmacy: [facility][id number] - [address]  #: [phone] Route: Oral  
  
We Performed the Following CBC WITH AUTOMATED DIFF [31374 CPT(R)] METABOLIC PANEL, COMPREHENSIVE [07148 CPT(R)] Follow-up Instructions Return if symptoms worsen or fail to improve. To-Do List   
 05/07/2018 Imaging:  US ABD [facility] Patient Instructions Start omperazole 20mg once daily Introducing [facility] SERVICES! [employer] introduces [employer] patient portal. Now you can access parts of your medical record, email your doctor's office, and request medication refills online. 1. In your internet browser, go to [email or web address] 2. Click on the First Time User? Click Here link in the Sign In box. You will see the New Member Sign Up page. 3. Enter your WeGame Access Code exactly as it appears below. You will not need to use this code after youve completed the sign-up process. If you do not sign up before the expiration date, you must request a new code. · WeGame Access Code: [email or web address] Expires: 8/1/2018 10:50 AM 
 
4. Enter the last four digits of your Social Security Number (xxxx) and Date of Birth (mm/dd/yyyy) as indicated and click Submit. You will be taken to the next sign-up page. 5. Create a WeGame ID. This will be your WeGame login ID and cannot be changed, so think of one that is secure and easy to remember. 6. Create a WeGame password. You can change your password at any time. 7. Enter your Password Reset Question and Answer. This can be used at a later time if you forget your password. 8. Enter your e-mail address. You will receive e-mail notification when new information is available in [address] 9. Click Sign Up. You can now view and download portions of your medical record. 10. Click the Download Summary menu link to download a portable copy of your medical information. If you have questions, please visit the Frequently Asked Questions section of the [employer] website. Remember, [employer] is NOT to be used for urgent needs. For medical emergencies, dial 911. Now available from your iPhone and Android! Please provide this summary of care documentation to your next provider. Your primary care clinician is listed as Renee Oliva. If you have any questions after today's visit, please call [phone].

## 2018-05-03 NOTE — PROGRESS NOTES
Subjective:      Ousmane Mata is a 28 y.o. female who presents today for abdominal pain    No  used today, waiver signed    Abdominal pain x 1 month  Getting worse over the past 2 days or so  Described as a burning pain, radiates to back  Pain worse with eating, \"bad after food\"  No blood in stool  No nausea or vomiting  No fevers or chills  Took ibuprofen after IUD in November, took for 1 week. Then stopped. No other NSAID use  + heartburn occasionally    There are no active problems to display for this patient. Current Outpatient Prescriptions   Medication Sig Dispense Refill    omeprazole (PRILOSEC) 20 mg capsule Take 1 Cap by mouth daily. 30 Cap 1    levonorgestrel (MIRENA) 20 mcg/24 hr (5 years) IUD 1 Device by IntraUTERine route once. Review of Systems    Pertinent items are noted in HPI. Objective:     Visit Vitals    /62    Pulse 66    Resp 16     General appearance: alert, cooperative, no distress, appears stated age  Head: Normocephalic, without obvious abnormality, atraumatic  Lungs: clear to auscultation bilaterally  Heart: regular rate and rhythm, S1, S2 normal, no murmur, click, rub or gallop  Abdomen: soft, moderately tender RUQ and LUQ abdomen, no guarding or rebound tenderness. Bowel sounds normal. No masses,  no organomegaly  Extremities: extremities normal, steady gait  Skin: warm and dry  Neurologic: Grossly normal    Assessment/Plan:     1. Abdominal pain, unspecified abdominal location  - schedule ultrasound to evaluate for gallstones  - US ABD LTD; Future  - METABOLIC PANEL, COMPREHENSIVE  - CBC WITH AUTOMATED DIFF  - omeprazole (PRILOSEC) 20 mg capsule; Take 1 Cap by mouth daily. Dispense: 30 Cap; Refill: 1      Advised her to call back or return to office if symptoms worsen/change/persist.  Discussed expected course/resolution/complications of diagnosis in detail with patient.     Medication risks/benefits/costs/interactions/alternatives discussed with patient. She was given an after visit summary which includes diagnoses, current medications, & vitals. She expressed understanding with the diagnosis and plan.     JAQCUES Saunders

## 2018-05-08 LAB
ALBUMIN SERPL-MCNC: 4.6 G/DL (ref 3.5–5.5)
ALBUMIN/GLOB SERPL: 1.8 {RATIO} (ref 1.2–2.2)
ALP SERPL-CCNC: 42 IU/L (ref 39–117)
ALT SERPL-CCNC: 21 IU/L (ref 0–32)
AST SERPL-CCNC: 18 IU/L (ref 0–40)
BASOPHILS # BLD AUTO: 0 X10E3/UL (ref 0–0.2)
BASOPHILS NFR BLD AUTO: 0 %
BILIRUB SERPL-MCNC: 0.3 MG/DL (ref 0–1.2)
BUN SERPL-MCNC: 23 MG/DL (ref 6–20)
BUN/CREAT SERPL: 34 (ref 9–23)
CALCIUM SERPL-MCNC: 9.3 MG/DL (ref 8.7–10.2)
CHLORIDE SERPL-SCNC: 99 MMOL/L (ref 96–106)
CO2 SERPL-SCNC: 24 MMOL/L (ref 18–29)
CREAT SERPL-MCNC: 0.68 MG/DL (ref 0.57–1)
EOSINOPHIL # BLD AUTO: 0.1 X10E3/UL (ref 0–0.4)
EOSINOPHIL NFR BLD AUTO: 3 %
ERYTHROCYTE [DISTWIDTH] IN BLOOD BY AUTOMATED COUNT: 12.8 % (ref 12.3–15.4)
GFR SERPLBLD CREATININE-BSD FMLA CKD-EPI: 116 ML/MIN/1.73
GFR SERPLBLD CREATININE-BSD FMLA CKD-EPI: 134 ML/MIN/1.73
GLOBULIN SER CALC-MCNC: 2.5 G/DL (ref 1.5–4.5)
GLUCOSE SERPL-MCNC: 93 MG/DL (ref 65–99)
HCT VFR BLD AUTO: 38.5 % (ref 34–46.6)
HGB BLD-MCNC: 12.6 G/DL (ref 11.1–15.9)
IMM GRANULOCYTES # BLD: 0 X10E3/UL (ref 0–0.1)
IMM GRANULOCYTES NFR BLD: 0 %
LYMPHOCYTES # BLD AUTO: 1.4 X10E3/UL (ref 0.7–3.1)
LYMPHOCYTES NFR BLD AUTO: 28 %
MCH RBC QN AUTO: 29.1 PG (ref 26.6–33)
MCHC RBC AUTO-ENTMCNC: 32.7 G/DL (ref 31.5–35.7)
MCV RBC AUTO: 89 FL (ref 79–97)
MONOCYTES # BLD AUTO: 0.2 X10E3/UL (ref 0.1–0.9)
MONOCYTES NFR BLD AUTO: 4 %
NEUTROPHILS # BLD AUTO: 3.1 X10E3/UL (ref 1.4–7)
NEUTROPHILS NFR BLD AUTO: 65 %
PLATELET # BLD AUTO: 252 X10E3/UL (ref 150–379)
POTASSIUM SERPL-SCNC: 3.9 MMOL/L (ref 3.5–5.2)
PROT SERPL-MCNC: 7.1 G/DL (ref 6–8.5)
RBC # BLD AUTO: 4.33 X10E6/UL (ref 3.77–5.28)
SODIUM SERPL-SCNC: 138 MMOL/L (ref 134–144)
WBC # BLD AUTO: 4.8 X10E3/UL (ref 3.4–10.8)

## 2018-05-09 ENCOUNTER — HOSPITAL ENCOUNTER (OUTPATIENT)
Dept: ULTRASOUND IMAGING | Age: 33
Discharge: HOME | End: 2018-05-09
Attending: NURSE PRACTITIONER
Payer: MEDICAID

## 2018-05-09 DIAGNOSIS — R10.9 ABDOMINAL PAIN, UNSPECIFIED ABDOMINAL LOCATION: ICD-10-CM

## 2018-05-09 PROCEDURE — 76705 ECHO EXAM OF ABDOMEN: CPT

## 2018-11-02 ENCOUNTER — OFFICE VISIT (OUTPATIENT)
Dept: INTERNAL MEDICINE CLINIC | Age: 33
End: 2018-11-02

## 2018-11-02 VITALS
SYSTOLIC BLOOD PRESSURE: 112 MMHG | RESPIRATION RATE: 14 BRPM | HEIGHT: 60 IN | WEIGHT: 118 LBS | TEMPERATURE: 97.4 F | HEART RATE: 64 BPM | BODY MASS INDEX: 23.16 KG/M2 | DIASTOLIC BLOOD PRESSURE: 70 MMHG | OXYGEN SATURATION: 97 %

## 2018-11-02 DIAGNOSIS — L70.9 ACNE, UNSPECIFIED ACNE TYPE: Primary | ICD-10-CM

## 2018-11-02 RX ORDER — CLINDAMYCIN PHOSPHATE 10 MG/G
GEL TOPICAL 2 TIMES DAILY
Qty: 15 G | Refills: 0 | Status: SHIPPED | OUTPATIENT
Start: 2018-11-02 | End: 2019-07-22 | Stop reason: ALTCHOICE

## 2018-11-02 NOTE — PROGRESS NOTES
Patient's identity verified with two patient identifiers (name and date of birth). Patient is MATTHEW speaking, some English, refused blue phones, consent form provided. Reviewed record in preparation for visit and have obtained necessary documentation. 1. Have you been to the ER, urgent care clinic since your last visit? Hospitalized since your last visit? No  2. Have you seen or consulted any other health care providers outside of the [address] [place] [address] since your last visit? Include any pap smears or colon screening. No    Chief Complaint   Patient presents with    Abdominal Pain     5month follow up, no complaints, doing better.  Ear Fullness     Bilateral ear itching/fullness, intermittent pain & headache. Denies nasal congestion/cough.  Acne     Complains of acne breakouts, facial, sister uses Clindoxyl gel & wants Rx. Typically occurs w/ cycles. Not fasting. Health Maintenance Due   Topic Date Due    Influenza Age 5 to Adult   Done per pt, Adult Education, 10/24/2018. 08/01/2018       Wt Readings from Last 3 Encounters:   11/02/18 118 lb (53.5 kg)   01/29/18 121 lb (54.9 kg)   12/19/17 124 lb (56.2 kg)     Temp Readings from Last 3 Encounters:   11/02/18 97.4 °F (36.3 °C) (Oral)   05/03/18 99.4 °F (37.4 °C)   01/29/18 99 °F (37.2 °C) (Oral)     BP Readings from Last 3 Encounters:   11/02/18 112/70   05/03/18 102/62   01/29/18 118/68     Pulse Readings from Last 3 Encounters:   11/02/18 64   05/03/18 66   01/29/18 77     Patient is accompanied by  (intermittently, rooming process was done alone) I have received verbal consent from Venkat Estrada to discuss any/all medical information while they are present in the room.

## 2018-11-02 NOTE — PATIENT INSTRUCTIONS
Acne: Care Instructions  Your Care Instructions  Acne is a skin problem that shows up as blackheads, whiteheads, and pimples. It most often affects the face, neck, and upper body. Acne occurs when oil and dead skin cells clog the skin's pores. Acne usually starts during the teen years and often lasts into adulthood. Gentle cleansing every day controls most mild acne. If home treatment does not work, your doctor may prescribe creams, antibiotics, or a stronger medicine called isotretinoin. Sometimes birth control pills help women who have monthly acne flare-ups. Follow-up care is a key part of your treatment and safety. Be sure to make and go to all appointments, and call your doctor if you are having problems. It's also a good idea to know your test results and keep a list of the medicines you take. How can you care for yourself at home? · Gently wash your face 1 or 2 times a day with warm (not hot) water and a mild soap or cleanser. Always rinse well. · Use an over-the-counter lotion or gel that contains benzoyl peroxide. Start with a small amount of 2.5% benzoyl peroxide and increase the strength as needed. Benzoyl peroxide works well for acne, but you may need to use it for up to 2 months before your acne starts to improve. · Apply acne cream, lotion, or gel to all the places you get pimples, blackheads, or whiteheads, not just where you have them now. Follow the instructions carefully. If your skin gets too dry and scaly or red and sore, reduce the amount. For the best results, apply medicines as directed. Try not to miss doses. · Do not squeeze or pick pimples and blackheads. This can cause infection and scarring. · Use only oil-free makeup, sunscreen, and other skin care products that will not clog your pores. · Wash your hair every day, and try to keep it off your face and shoulders. Consider pinning it back or cutting it short. When should you call for help?   Watch closely for changes in your health, and be sure to contact your doctor if:    · You have tried home treatment for 6 to 8 weeks and your acne is not better or gets worse. Your doctor may need to add to or change your treatment.     · Your pimples become large and hard or filled with fluid.     · Scars form after pimples heal.     · You feel sad or hopeless, lack energy, or have other signs of depression while you are taking the prescription medicine isotretinoin.     · You start to have other symptoms, such as facial hair growth in women or bone and muscle pain. Where can you learn more? Go to http://brianda-tiana.info/. Enter V108 in the search box to learn more about \"Acne: Care Instructions. \"  Current as of: April 18, 2018  Content Version: 11.8  © 5572-2321 [employer]. Care instructions adapted under license by [employer] (which disclaims liability or warranty for this information). If you have questions about a medical condition or this instruction, always ask your healthcare professional. [address] any warranty or liability for your use of this information.

## 2018-11-02 NOTE — PROGRESS NOTES
Subjective:      Panda Tristan is a 28 y.o. female who presents today for f/u abdominal pain    No  used today, waiver signed    Abdominal pain reported earlier this year has completely resolved  U/S evaluation was normal  No further abdominal pain  No fever, chills, abdominal bloating, nausea, vomiting, or blood in stool    Has IUD, intermittent spotting, no regular menses  History of yeast infection- no recent vaginal discharge, odor, or pain  No difficulty urinating or dysuria    Has acne on her face, not responding to otc treatments- has tried multiple  Sister has same problem and uses clindagel, wondering if can use this    There are no active problems to display for this patient. Current Outpatient Medications   Medication Sig Dispense Refill    clindamycin (CLINDAGEL) 1 % topical gel Apply  to affected area two (2) times a day. use thin film on affected area 15 g 0    levonorgestrel (MIRENA) 20 mcg/24 hr (5 years) IUD 1 Device by IntraUTERine route once.  omeprazole (PRILOSEC) 20 mg capsule Take 1 Cap by mouth daily. 30 Cap 1        Review of Systems    Pertinent items are noted in HPI. Objective:     Visit Vitals  /70 (BP 1 Location: Left arm, BP Patient Position: Sitting)   Pulse 64   Temp 97.4 °F (36.3 °C) (Oral)   Resp 14   Ht 5' (1.524 m)   Wt 118 lb (53.5 kg)   LMP 10/24/2018 (Exact Date)   SpO2 97%   Breastfeeding? No   BMI 23.05 kg/m²     General appearance: alert, cooperative, no distress, appears stated age  Head: Normocephalic, without obvious abnormality, atraumatic  Lungs: clear to auscultation bilaterally  Heart: regular rate and rhythm, S1, S2 normal, no murmur, click, rub or gallop  Abdomen: soft, non-tender. Bowel sounds normal. No masses,  no organomegaly  Skin: acne across cheeks and chin  Neurologic: Grossly normal  Psych: calm, cooperative, appropriate affect    Assessment/Plan:     1.  Acne, unspecified acne type  - okay to trial clindagel, pt to let me know if no improvement or worsens  - clindamycin (CLINDAGEL) 1 % topical gel; Apply  to affected area two (2) times a day. use thin film on affected area  Dispense: 15 g; Refill: 0      Advised her to call back or return to office if symptoms worsen/change/persist.  Discussed expected course/resolution/complications of diagnosis in detail with patient. Medication risks/benefits/costs/interactions/alternatives discussed with patient. She was given an after visit summary which includes diagnoses, current medications, & vitals. She expressed understanding with the diagnosis and plan.     JACQUES Everett

## 2019-01-15 ENCOUNTER — OFFICE VISIT (OUTPATIENT)
Dept: OBGYN CLINIC | Age: 34
End: 2019-01-15

## 2019-01-15 VITALS
SYSTOLIC BLOOD PRESSURE: 110 MMHG | HEIGHT: 60 IN | BODY MASS INDEX: 23.16 KG/M2 | DIASTOLIC BLOOD PRESSURE: 80 MMHG | WEIGHT: 118 LBS

## 2019-01-15 DIAGNOSIS — Z30.431 ENCOUNTER FOR ROUTINE CHECKING OF INTRAUTERINE CONTRACEPTIVE DEVICE (IUD): Primary | ICD-10-CM

## 2019-01-15 LAB
HCG URINE, QL. (POC): NEGATIVE
VALID INTERNAL CONTROL?: YES

## 2019-01-15 RX ORDER — MEDROXYPROGESTERONE ACETATE 5 MG/1
5 TABLET ORAL DAILY
Qty: 30 TAB | Refills: 2 | Status: SHIPPED | OUTPATIENT
Start: 2019-01-15 | End: 2019-03-06

## 2019-01-15 NOTE — PATIENT INSTRUCTIONS
Learning About Birth Control: Intrauterine Device (IUD)  What is an intrauterine device (IUD)? The intrauterine device (IUD) is used to prevent pregnancy. It's a small, plastic, T-shaped device. Your doctor places the IUD in your uterus. You have a choice between a hormonal IUD and a copper IUD. The hormonal IUD can prevent pregnancy for 3 to 5 years, depending on which IUD is used. Once you have it, you don't have to do anything else to prevent pregnancy. The copper IUD can prevent pregnancy for 10 years. Once you have it, you don't have to do anything else to prevent pregnancy. A string tied to the end of the IUD hangs down through the opening of the uterus (called the cervix) into the vagina. You can check that the IUD is in place by feeling for the string. The IUD usually stays in the uterus until your doctor removes it. How well does it work? In the first year of use:  · When the hormonal IUD is used exactly as directed, fewer than 1 woman out of 100 has an unplanned pregnancy. · When the copper IUD is used exactly as directed, fewer than 1 woman out of 100 has an unplanned pregnancy. Be sure to tell your doctor about any health problems you have or medicines you take. He or she can help you choose the birth control method that is right for you. What are the advantages of an IUD? · An IUD is one of the most effective methods of birth control. · It prevents pregnancy for 3 to 10 years, depending on the type. You don't have to worry about birth control during this time. · It's safe to use while breastfeeding. · IUDs don't contain estrogen. So you can use an IUD if you don't want to take estrogen or can't take estrogen because you have certain health problems or concerns. · An IUD is convenient. It is always providing birth control. You don't need to remember to take a pill or get a shot. You don't have to interrupt sex to protect against pregnancy.   · A hormonal IUD may reduce heavy bleeding and cramping. What are the disadvantages of an IUD? · An IUD doesn't protect against sexually transmitted infections (STIs), such as herpes or HIV/AIDS. If you aren't sure if your sex partner might have an STI, use a condom to protect against disease. · A copper IUD may cause periods with more bleeding and cramping. · You have to see a doctor to have an IUD inserted and removed. · You have to check to see if the string is in place. Where can you learn more? Go to http://brianda-tiana.info/. Enter P377 in the search box to learn more about \"Learning About Birth Control: Intrauterine Device (IUD). \"  Current as of: November 21, 2017  Content Version: 11.8  © 2050-6946 [employer]. Care instructions adapted under license by [employer] (which disclaims liability or warranty for this information). If you have questions about a medical condition or this instruction, always ask your healthcare professional. [address] any warranty or liability for your use of this information.

## 2019-01-15 NOTE — PROGRESS NOTES
phone used. Patient presents today to discuss IUD. Has not had a period for >30d. UPT negative. She strongly desires period. See attached ultrasound report. We discussed findings of US today including IUD in correct place. We discussed options for mgmt today including IUD removal versus expectant mgmt versus continuing IUD with trial of cyclic progesterone. Reviewed side-effects of IUD including spacing menses. She strongly desires cyclic progesterone trial.    Visit time 15min. More than 50% of my face-to-face time was spend on patient counseling.     Signed By: Franck Muro     January 15, 2019

## 2019-03-02 NOTE — PROGRESS NOTES
Subjective:      Willow Steinberg is a 35 y.o. female who presents today for CPE    Has IUD in place, was concerned that she had not had menstrual cycle in > 30 days. Saw Dr. Oneida Gasca 01/2019. IUD in place, confirmed with ultrasound. UPT negative. Per patient request, progesterone prescribed. Brought on menstrual cycle, LMP 2/13/19    Using clindamycin gel for acne, had requested as family member uses with good improvement. Has been working well      Denies any chest pain, palpitations, shortness of breath, cough, abdominal pain, bowel changes, blood in stool, difficulty urinating, headaches, or dizziness    No depression or anxiety    Has been having some low back pain in the mornings. Wakes up with back pain, resolves with moving around  Mattress is old    No regular exercise, chases 3 kids around  [facility]    Pap: UTD  Influenza: UTD  Tdap: UTD    Patient Active Problem List    Diagnosis Date Noted    Other acne 03/06/2019     Current Outpatient Medications   Medication Sig Dispense Refill    medroxyPROGESTERone (PROVERA) 5 mg tablet Take 1 Tab by mouth daily. Take 1 tab by mouth daily, the first 5 days of each month 30 Tab 2    clindamycin (CLINDAGEL) 1 % topical gel Apply  to affected area two (2) times a day. use thin film on affected area 15 g 0    omeprazole (PRILOSEC) 20 mg capsule Take 1 Cap by mouth daily. 30 Cap 1    levonorgestrel (MIRENA) 20 mcg/24 hr (5 years) IUD 1 Device by IntraUTERine route once. Review of Systems    Pertinent items are noted in HPI. Objective:     Visit Vitals  /60 (BP 1 Location: Left arm, BP Patient Position: Sitting)   Pulse 67   Temp 98 °F (36.7 °C) (Oral)   Resp 18   Ht 5' 1\" (1.549 m)   Wt 121 lb 12.8 oz (55.2 kg)   SpO2 98%   BMI 23.01 kg/m²     General:  Alert, cooperative, no distress, appears stated age. Head:  Normocephalic, without obvious abnormality, atraumatic. Eyes:  Conjunctivae/corneas clear.  PERRL, EOMs intact   Ears:  Normal TMs and external ear canals both ears. Nose: Nares normal. Septum midline. Mucosa normal. No drainage or sinus tenderness. Throat: Lips, mucosa, and tongue normal. Teeth and gums normal.   Neck: Supple, symmetrical, trachea midline, no adenopathy, thyroid: no enlargement/tenderness/nodules, no carotid bruit and no JVD. Back:   Symmetric, no curvature. ROM normal. No CVA tenderness. Lungs:   Clear to auscultation bilaterally. Chest wall:  No tenderness or deformity. Heart:  Regular rate and rhythm, S1, S2 normal, no murmur, click, rub or gallop. Abdomen:   Soft, non-tender. Bowel sounds normal. No masses,  No organomegaly. Extremities: Extremities normal, atraumatic, no cyanosis or edema. Pulses: 2+ and symmetric all extremities. Skin: Skin color, texture, turgor normal. No rashes or lesions. Lymph nodes: Cervical, supraclavicular nodes normal.   Neurologic: CNII-XII intact. Normal strength, sensation throughout. Assessment/Plan:     1. Annual physical exam  -  UTD  - encouraged self care, exercise, healthy diet   - LIPID PANEL  - METABOLIC PANEL, COMPREHENSIVE  - CBC WITH AUTOMATED DIFF  - TSH 3RD GENERATION  - VITAMIN D, 25 HYDROXY  - HEMOGLOBIN A1C WITH EAG    2. Vitamin D insufficiency  - VITAMIN D, 25 HYDROXY    Advised her to call back or return to office if symptoms worsen/change/persist.  Discussed expected course/resolution/complications of diagnosis in detail with patient. Medication risks/benefits/costs/interactions/alternatives discussed with patient. She was given an after visit summary which includes diagnoses, current medications, & vitals. She expressed understanding with the diagnosis and plan.     JACQUES Bee

## 2019-03-06 ENCOUNTER — TELEPHONE (OUTPATIENT)
Dept: INTERNAL MEDICINE CLINIC | Age: 34
End: 2019-03-06

## 2019-03-06 ENCOUNTER — OFFICE VISIT (OUTPATIENT)
Dept: INTERNAL MEDICINE CLINIC | Age: 34
End: 2019-03-06

## 2019-03-06 VITALS
TEMPERATURE: 98 F | SYSTOLIC BLOOD PRESSURE: 110 MMHG | WEIGHT: 121.8 LBS | HEART RATE: 67 BPM | HEIGHT: 61 IN | RESPIRATION RATE: 18 BRPM | BODY MASS INDEX: 23 KG/M2 | OXYGEN SATURATION: 98 % | DIASTOLIC BLOOD PRESSURE: 60 MMHG

## 2019-03-06 DIAGNOSIS — Z00.00 ANNUAL PHYSICAL EXAM: Primary | ICD-10-CM

## 2019-03-06 DIAGNOSIS — E55.9 VITAMIN D INSUFFICIENCY: ICD-10-CM

## 2019-03-06 PROBLEM — L70.8 OTHER ACNE: Status: ACTIVE | Noted: 2019-03-06

## 2019-03-06 RX ORDER — FLUCONAZOLE 150 MG/1
150 TABLET ORAL DAILY
Qty: 1 TAB | Refills: 0 | Status: SHIPPED | OUTPATIENT
Start: 2019-03-06 | End: 2019-03-07

## 2019-03-06 NOTE — TELEPHONE ENCOUNTER
----- Message from Jg Landry sent at 3/6/2019  4:15 PM EST -----  Regarding: Kt Knapp NP/ telephone  Pt states that no medication was sent to the Pharmacy [facility] [address] [facility]  Pts contact [phone]

## 2019-03-06 NOTE — PROGRESS NOTES
Reviewed record in preparation for visit and have obtained necessary documentation. Identified pt with two pt identifiers(name and ). There are no preventive care reminders to display for this patient. Chief Complaint   Patient presents with    Complete Physical        Wt Readings from Last 3 Encounters:   19 121 lb 12.8 oz (55.2 kg)   01/15/19 118 lb (53.5 kg)   18 118 lb (53.5 kg)     Temp Readings from Last 3 Encounters:   18 97.4 °F (36.3 °C) (Oral)   18 99.4 °F (37.4 °C)   18 99 °F (37.2 °C) (Oral)     BP Readings from Last 3 Encounters:   01/15/19 110/80   18 112/70   18 102/62     Pulse Readings from Last 3 Encounters:   18 64   18 66   18 77           Learning Assessment:  :     Learning Assessment 3/6/2019 10/20/2017   PRIMARY LEARNER Patient Patient   HIGHEST LEVEL OF EDUCATION - PRIMARY LEARNER  DID NOT GRADUATE HIGH SCHOOL DID NOT GRADUATE HIGH SCHOOL   BARRIERS PRIMARY LEARNER NONE CULTURAL     - LANGUAGE   CO-LEARNER CAREGIVER - Yes   CO-LEARNER NAME - [facility] OTHER (COMMENT) OTHER (COMMENT)   LEARNER PREFERENCE PRIMARY DEMONSTRATION READING   ANSWERED BY patient patient   RELATIONSHIP SELF SELF       Depression Screening:  :     3 most recent PHQ Screens 3/6/2019   Little interest or pleasure in doing things Not at all   Feeling down, depressed, irritable, or hopeless Not at all   Total Score PHQ 2 0       Fall Risk Assessment:  :     Fall Risk Assessment, last 12 mths 3/6/2019   Able to walk? Yes   Fall in past 12 months? No       Abuse Screening:  :     Abuse Screening Questionnaire 3/6/2019 2018 10/20/2017   Do you ever feel afraid of your partner? N N N   Are you in a relationship with someone who physically or mentally threatens you? N N N   Is it safe for you to go home?  [facility]       Coordination of Care Questionnaire:  :     1) Have you been to an emergency room, urgent care clinic since your last visit? no   Hospitalized since your last visit? no             2) Have you seen or consulted any other health care providers outside of [address] [place] [address] since your last visit? no  (Include any pap smears or colon screenings in this section.)    3) Do you have an Advance Directive on file? no    4) Are you interested in receiving information on Advance Directives? NO      Patient is accompanied by self I have received verbal consent from Christi Hutchins to discuss any/all medical information while they are present in the room.

## 2019-03-07 DIAGNOSIS — E55.9 VITAMIN D DEFICIENCY: Primary | ICD-10-CM

## 2019-03-07 LAB
25(OH)D3+25(OH)D2 SERPL-MCNC: 14.7 NG/ML (ref 30–100)
ALBUMIN SERPL-MCNC: 4.4 G/DL (ref 3.5–5.5)
ALBUMIN/GLOB SERPL: 1.6 {RATIO} (ref 1.2–2.2)
ALP SERPL-CCNC: 46 IU/L (ref 39–117)
ALT SERPL-CCNC: 28 IU/L (ref 0–32)
AST SERPL-CCNC: 20 IU/L (ref 0–40)
BASOPHILS # BLD AUTO: 0 X10E3/UL (ref 0–0.2)
BASOPHILS NFR BLD AUTO: 0 %
BILIRUB SERPL-MCNC: 0.7 MG/DL (ref 0–1.2)
BUN SERPL-MCNC: 12 MG/DL (ref 6–20)
BUN/CREAT SERPL: 18 (ref 9–23)
CALCIUM SERPL-MCNC: 9.3 MG/DL (ref 8.7–10.2)
CHLORIDE SERPL-SCNC: 104 MMOL/L (ref 96–106)
CHOLEST SERPL-MCNC: 150 MG/DL (ref 100–199)
CO2 SERPL-SCNC: 25 MMOL/L (ref 20–29)
CREAT SERPL-MCNC: 0.65 MG/DL (ref 0.57–1)
EOSINOPHIL # BLD AUTO: 0.1 X10E3/UL (ref 0–0.4)
EOSINOPHIL NFR BLD AUTO: 2 %
ERYTHROCYTE [DISTWIDTH] IN BLOOD BY AUTOMATED COUNT: 12.9 % (ref 12.3–15.4)
EST. AVERAGE GLUCOSE BLD GHB EST-MCNC: 97 MG/DL
GLOBULIN SER CALC-MCNC: 2.8 G/DL (ref 1.5–4.5)
GLUCOSE SERPL-MCNC: 90 MG/DL (ref 65–99)
HBA1C MFR BLD: 5 % (ref 4.8–5.6)
HCT VFR BLD AUTO: 40 % (ref 34–46.6)
HDLC SERPL-MCNC: 40 MG/DL
HGB BLD-MCNC: 13 G/DL (ref 11.1–15.9)
IMM GRANULOCYTES # BLD AUTO: 0 X10E3/UL (ref 0–0.1)
IMM GRANULOCYTES NFR BLD AUTO: 0 %
INTERPRETATION, 910389: NORMAL
LDLC SERPL CALC-MCNC: 99 MG/DL (ref 0–99)
LYMPHOCYTES # BLD AUTO: 1 X10E3/UL (ref 0.7–3.1)
LYMPHOCYTES NFR BLD AUTO: 22 %
MCH RBC QN AUTO: 29 PG (ref 26.6–33)
MCHC RBC AUTO-ENTMCNC: 32.5 G/DL (ref 31.5–35.7)
MCV RBC AUTO: 89 FL (ref 79–97)
MONOCYTES # BLD AUTO: 0.4 X10E3/UL (ref 0.1–0.9)
MONOCYTES NFR BLD AUTO: 8 %
NEUTROPHILS # BLD AUTO: 3.2 X10E3/UL (ref 1.4–7)
NEUTROPHILS NFR BLD AUTO: 68 %
PLATELET # BLD AUTO: 229 X10E3/UL (ref 150–379)
POTASSIUM SERPL-SCNC: 4.3 MMOL/L (ref 3.5–5.2)
PROT SERPL-MCNC: 7.2 G/DL (ref 6–8.5)
RBC # BLD AUTO: 4.49 X10E6/UL (ref 3.77–5.28)
SODIUM SERPL-SCNC: 143 MMOL/L (ref 134–144)
TRIGL SERPL-MCNC: 56 MG/DL (ref 0–149)
TSH SERPL DL<=0.005 MIU/L-ACNC: 2.05 UIU/ML (ref 0.45–4.5)
VLDLC SERPL CALC-MCNC: 11 MG/DL (ref 5–40)
WBC # BLD AUTO: 4.7 X10E3/UL (ref 3.4–10.8)

## 2019-03-07 RX ORDER — ERGOCALCIFEROL 1.25 MG/1
50000 CAPSULE ORAL
Qty: 8 CAP | Refills: 0 | Status: SHIPPED | OUTPATIENT
Start: 2019-03-07 | End: 2019-07-22 | Stop reason: ALTCHOICE

## 2019-03-14 ENCOUNTER — DOCUMENTATION ONLY (OUTPATIENT)
Dept: INTERNAL MEDICINE CLINIC | Age: 34
End: 2019-03-14

## 2019-03-14 NOTE — PROGRESS NOTES
Pt recieved her lab result but did not understand the the result t and I  told her the result and  And also let her know a prescription was call in to the pharmacy for her vitamin d  And the pt  Mario Krueger she understand

## 2019-07-22 ENCOUNTER — OFFICE VISIT (OUTPATIENT)
Dept: INTERNAL MEDICINE CLINIC | Age: 34
End: 2019-07-22

## 2019-07-22 VITALS
TEMPERATURE: 98.5 F | HEIGHT: 61 IN | SYSTOLIC BLOOD PRESSURE: 110 MMHG | WEIGHT: 123 LBS | BODY MASS INDEX: 23.22 KG/M2 | OXYGEN SATURATION: 97 % | DIASTOLIC BLOOD PRESSURE: 60 MMHG | HEART RATE: 82 BPM | RESPIRATION RATE: 16 BRPM

## 2019-07-22 DIAGNOSIS — S30.814A ABRASION OF LABIA, INITIAL ENCOUNTER: ICD-10-CM

## 2019-07-22 DIAGNOSIS — N76.0 ACUTE VAGINITIS: Primary | ICD-10-CM

## 2019-07-22 NOTE — PROGRESS NOTES
Navdy Phone  utilized - ID # [id number]    36 yo Jayda speaking female c/o painful intercourse and vaginal discharge as well as pain with urination. PE: Non-English speaking femal is alone/unaccompanied in exam room today   She appears in NAD   External genitalia - R external to labia majora - linear skin break w/ no active drainage   Vagina - thick white and yellow drainage w/ foul odor    Imp: Vaginitis   External Perineal Laceration/tear    Plan: Vag culture for yeast and BV     Neosporin to external wound   Avoid sexual intercourse until disorder is defined and treated     23:42 sec - approx length of phone communication  ______________________________  Expected course of current diagnosed problem(s) as well as expected progression and possible complications, and desired follow up with provider are discussed with patient through the phone . Patient is encouraged to be back in touch with any questions or concerns. Patient expresses understanding of plan of care. Patient is given AVS which includes diagnoses, current medications, vitals.

## 2019-07-22 NOTE — PATIENT INSTRUCTIONS
Neosporin ointment to abrasion in private area twice a day    I will let you know once the culture report is back, and we will then know what medication you need for the vaginal internal infection - in 2-3 days

## 2019-07-25 ENCOUNTER — TELEPHONE (OUTPATIENT)
Dept: INTERNAL MEDICINE CLINIC | Age: 34
End: 2019-07-25

## 2019-07-25 DIAGNOSIS — B37.31 CANDIDIASIS OF VAGINA: Primary | ICD-10-CM

## 2019-07-25 LAB
A VAGINAE DNA VAG QL NAA+PROBE: ABNORMAL SCORE
BVAB2 DNA VAG QL NAA+PROBE: ABNORMAL SCORE
C ALBICANS DNA VAG QL NAA+PROBE: POSITIVE
C GLABRATA DNA VAG QL NAA+PROBE: NEGATIVE
MEGA1 DNA VAG QL NAA+PROBE: ABNORMAL SCORE

## 2019-07-25 RX ORDER — FLUCONAZOLE 150 MG/1
150 TABLET ORAL DAILY
Qty: 1 TAB | Refills: 0 | Status: SHIPPED | OUTPATIENT
Start: 2019-07-25 | End: 2019-07-26 | Stop reason: SDUPTHER

## 2019-07-25 NOTE — TELEPHONE ENCOUNTER
Pt was seen on [id number]  By betsy and  Pt said she was told to call the office to get the result of her labs and also to get meds you have to use the  phone  Pt speaks  Agbibi Stringer #[phone]

## 2019-09-23 ENCOUNTER — OFFICE VISIT (OUTPATIENT)
Dept: INTERNAL MEDICINE CLINIC | Age: 34
End: 2019-09-23

## 2019-09-23 VITALS
OXYGEN SATURATION: 98 % | DIASTOLIC BLOOD PRESSURE: 62 MMHG | WEIGHT: 119.3 LBS | TEMPERATURE: 98.8 F | HEART RATE: 64 BPM | BODY MASS INDEX: 22.52 KG/M2 | HEIGHT: 61 IN | RESPIRATION RATE: 14 BRPM | SYSTOLIC BLOOD PRESSURE: 110 MMHG

## 2019-09-23 DIAGNOSIS — Z13.220 SCREENING, LIPID: ICD-10-CM

## 2019-09-23 DIAGNOSIS — R42 DIZZINESS: ICD-10-CM

## 2019-09-23 DIAGNOSIS — M54.50 CHRONIC LEFT-SIDED LOW BACK PAIN WITHOUT SCIATICA: Primary | ICD-10-CM

## 2019-09-23 DIAGNOSIS — G89.29 CHRONIC LEFT-SIDED LOW BACK PAIN WITHOUT SCIATICA: Primary | ICD-10-CM

## 2019-09-23 RX ORDER — METHYLPREDNISOLONE 4 MG/1
TABLET ORAL
Qty: 1 DOSE PACK | Refills: 0 | Status: SHIPPED | OUTPATIENT
Start: 2019-09-23 | End: 2020-01-15 | Stop reason: ALTCHOICE

## 2019-09-23 NOTE — PROGRESS NOTES
Chief Complaint   Patient presents with    Back Pain    Headache     Reviewed record in preparation for visit and have obtained necessary documentation. Identified pt with two pt identifiers(name and ). Health Maintenance Due   Topic    Influenza Age 5 to Adult          Chief Complaint   Patient presents with    Back Pain    Headache        Wt Readings from Last 3 Encounters:   19 119 lb 4.8 oz (54.1 kg)   19 123 lb (55.8 kg)   19 121 lb 12.8 oz (55.2 kg)     Temp Readings from Last 3 Encounters:   19 98.8 °F (37.1 °C)   19 98.5 °F (36.9 °C) (Oral)   19 98 °F (36.7 °C) (Oral)     BP Readings from Last 3 Encounters:   19 90/60   19 110/60   19 110/60     Pulse Readings from Last 3 Encounters:   19 64   19 82   19 67           Learning Assessment:  :     Learning Assessment 3/6/2019 10/20/2017   PRIMARY LEARNER Patient Patient   HIGHEST LEVEL OF EDUCATION - PRIMARY LEARNER  DID NOT GRADUATE HIGH SCHOOL DID NOT GRADUATE HIGH SCHOOL   BARRIERS PRIMARY LEARNER NONE CULTURAL     - LANGUAGE   CO-LEARNER CAREGIVER - Yes   CO-LEARNER NAME - TRANSLATER   PRIMARY LANGUAGE OTHER (COMMENT) OTHER (COMMENT)   LEARNER PREFERENCE PRIMARY DEMONSTRATION READING   ANSWERED BY patient patient   RELATIONSHIP SELF SELF       Depression Screening:  :     3 most recent PHQ Screens 3/6/2019   Little interest or pleasure in doing things Not at all   Feeling down, depressed, irritable, or hopeless Not at all   Total Score PHQ 2 0       Fall Risk Assessment:  :     Fall Risk Assessment, last 12 mths 3/6/2019   Able to walk? Yes   Fall in past 12 months? No       Abuse Screening:  :     Abuse Screening Questionnaire 3/6/2019 2018 10/20/2017   Do you ever feel afraid of your partner? N N N   Are you in a relationship with someone who physically or mentally threatens you? N N N   Is it safe for you to go home?  Arminda Nichols       Coordination of Care Questionnaire:  :     1) Have you been to an emergency room, urgent care clinic since your last visit? no   Hospitalized since your last visit? no             2) Have you seen or consulted any other health care providers outside of [address] [place] [address] since your last visit? no  (Include any pap smears or colon screenings in this section.)    3) Do you have an Advance Directive on file? no    4) Are you interested in receiving information on Advance Directives? NO      Patient is accompanied by N/A I have received verbal consent from Papa Jansen to discuss any/all medical information while they are present in the room. Patient was assisted by [employer]  phone.   Tom Allen # [id number] Jayda Language

## 2019-09-23 NOTE — PROGRESS NOTES
Subjective:      Shiva Cortes is a 35 y.o. female who presents today for evaluation of low back pain    Jayda speaking   # [id number]    Last saw NP [facility] 7/22 for painful intercourse and vaginal discharge  Found to have yeast infection  Has OBARNOLDON Dr. Nathaly Adhikari that she follows with. Has IUD    I last saw pt in 03/2019 for CPE, told to rtc 1 year    Coming into clinic today for complaints of back pain  Left low back pain, no radiation into buttock or leg  2 years ago started having pain. Was walking, taking care of a baby, back locked and had severe pain  Intermittent since  Has been worsening over the past month- wakes up in the morning is a constant pain, worse with sitting or walking around  Has been taking ibuprofen and tylenol will help temporarily    No difficulty urinating or dysuria    No regular exercise, doesn't have time, has 3 children    Pt today also reporting feels poorly after eating bad/fatty foods. Would like her labs checked today  Fatty foods make her feel dizzy  No dizziness at other times  Eats at home almost all meals, rarely eats out  Chicken, rice, etc  No dizziness with position changes    Patient Active Problem List    Diagnosis Date Noted    Other acne 03/06/2019    Vitamin D insufficiency 03/06/2019     Current Outpatient Medications   Medication Sig Dispense Refill    levonorgestrel (MIRENA) 20 mcg/24 hr (5 years) IUD 1 Device by IntraUTERine route once. Review of Systems    Pertinent items are noted in HPI.      Objective:     Visit Vitals  /62   Pulse 64   Temp 98.8 °F (37.1 °C)   Resp 14   Ht 5' 1\" (1.549 m)   Wt 119 lb 4.8 oz (54.1 kg)   SpO2 98%   BMI 22.54 kg/m²     General appearance: alert, cooperative, no distress, appears stated age  Head: Normocephalic, without obvious abnormality, atraumatic  Neck: supple, symmetrical, trachea midline, no jvd, no kyphosis  Lungs: normal effort, no cough, cta bilaterally  Heart: regular rate and rhythm, no murmur  Abdomen: soft, non-tender. No diastasis  Back: normal ROM, no curvature, no midline tenderness. Straight leg raise negative  Extremities: extremities normal, atraumatic, no cyanosis or edema  Skin: Skin color, texture, turgor normal  Neurologic: Grossly normal  Psych: calm, cooperative, appropriate affect      Assessment/Plan:     1. Chronic left-sided low back pain without sciatica  - educated about importance of core strength  - exercises provided, images printed  - will treat with medrol dose pack to calm down acute inflammation  - methylPREDNISolone (MEDROL DOSEPACK) 4 mg tablet; As directed  Dispense: 1 Dose Pack; Refill: 0  - METABOLIC PANEL, COMPREHENSIVE  - CBC WITH AUTOMATED DIFF    2. Dizziness  - HEMOGLOBIN A1C WITH EAG  - TSH 3RD GENERATION  - METABOLIC PANEL, COMPREHENSIVE  - CBC WITH AUTOMATED DIFF    3. Screening, lipid  - LIPID PANEL      Advised her to call back or return to office if symptoms worsen/change/persist.  Discussed expected course/resolution/complications of diagnosis in detail with patient. Medication risks/benefits/costs/interactions/alternatives discussed with patient. She was given an after visit summary which includes diagnoses, current medications, & vitals. She expressed understanding with the diagnosis and plan.     JACQUES Mendez

## 2019-09-23 NOTE — PATIENT INSTRUCTIONS
Medrol Dose Pack          Low Back Pain: Exercises  Introduction  Here are some examples of exercises for you to try. The exercises may be suggested for a condition or for rehabilitation. Start each exercise slowly. Ease off the exercises if you start to have pain. You will be told when to start these exercises and which ones will work best for you. How to do the exercises  Press-up    1. Lie on your stomach, supporting your body with your forearms. 2. Press your elbows down into the floor to raise your upper back. As you do this, relax your stomach muscles and allow your back to arch without using your back muscles. As your press up, do not let your hips or pelvis come off the floor. 3. Hold for 15 to 30 seconds, then relax. 4. Repeat 2 to 4 times. Alternate arm and leg (bird dog) exercise    1. Start on the floor, on your hands and knees. 2. Tighten your belly muscles. 3. Raise one leg off the floor, and hold it straight out behind you. Be careful not to let your hip drop down, because that will twist your trunk. 4. Hold for about 6 seconds, then lower your leg and switch to the other leg. 5. Repeat 8 to 12 times on each leg. 6. Over time, work up to holding for 10 to 30 seconds each time. 7. If you feel stable and secure with your leg raised, try raising the opposite arm straight out in front of you at the same time. Knee-to-chest exercise    1. Lie on your back with your knees bent and your feet flat on the floor. 2. Bring one knee to your chest, keeping the other foot flat on the floor (or keeping the other leg straight, whichever feels better on your lower back). 3. Keep your lower back pressed to the floor. Hold for at least 15 to 30 seconds. 4. Relax, and lower the knee to the starting position. 5. Repeat with the other leg. Repeat 2 to 4 times with each leg. 6. To get more stretch, put your other leg flat on the floor while pulling your knee to your chest.    Curl-ups    1.  Lie on the floor on your back with your knees bent at a 90-degree angle. Your feet should be flat on the floor, about 12 inches from your buttocks. 2. Cross your arms over your chest. If this bothers your neck, try putting your hands behind your neck (not your head), with your elbows spread apart. 3. Slowly tighten your belly muscles and raise your shoulder blades off the floor. 4. Keep your head in line with your body, and do not press your chin to your chest.  5. Hold this position for 1 or 2 seconds, then slowly lower yourself back down to the floor. 6. Repeat 8 to 12 times. Pelvic tilt exercise    1. Lie on your back with your knees bent. 2. \"Brace\" your stomach. This means to tighten your muscles by pulling in and imagining your belly button moving toward your spine. You should feel like your back is pressing to the floor and your hips and pelvis are rocking back. 3. Hold for about 6 seconds while you breathe smoothly. 4. Repeat 8 to 12 times. Heel dig bridging    1. Lie on your back with both knees bent and your ankles bent so that only your heels are digging into the floor. Your knees should be bent about 90 degrees. 2. Then push your heels into the floor, squeeze your buttocks, and lift your hips off the floor until your shoulders, hips, and knees are all in a straight line. 3. Hold for about 6 seconds as you continue to breathe normally, and then slowly lower your hips back down to the floor and rest for up to 10 seconds. 4. Do 8 to 12 repetitions. Hamstring stretch in doorway    1. Lie on your back in a doorway, with one leg through the open door. 2. Slide your leg up the wall to straighten your knee. You should feel a gentle stretch down the back of your leg. 3. Hold the stretch for at least 15 to 30 seconds. Do not arch your back, point your toes, or bend either knee. Keep one heel touching the floor and the other heel touching the wall. 4. Repeat with your other leg.   5. Do 2 to 4 times for each leg. Hip flexor stretch    1. Kneel on the floor with one knee bent and one leg behind you. Place your forward knee over your foot. Keep your other knee touching the floor. 2. Slowly push your hips forward until you feel a stretch in the upper thigh of your rear leg. 3. Hold the stretch for at least 15 to 30 seconds. Repeat with your other leg. 4. Do 2 to 4 times on each side. Wall sit    1. Stand with your back 10 to 12 inches away from a wall. 2. Lean into the wall until your back is flat against it. 3. Slowly slide down until your knees are slightly bent, pressing your lower back into the wall. 4. Hold for about 6 seconds, then slide back up the wall. 5. Repeat 8 to 12 times. Follow-up care is a key part of your treatment and safety. Be sure to make and go to all appointments, and call your doctor if you are having problems. It's also a good idea to know your test results and keep a list of the medicines you take. Where can you learn more? Go to http://brianda-tiana.info/. Enter W121 in the search box to learn more about \"Low Back Pain: Exercises. \"  Current as of: June 26, 2019  Content Version: 12.2  © 9061-3813 [employer]. Care instructions adapted under license by [employer] (which disclaims liability or warranty for this information). If you have questions about a medical condition or this instruction, always ask your healthcare professional. [place] [address] any warranty or liability for your use of this information.

## 2019-09-24 LAB
ALBUMIN SERPL-MCNC: 4.7 G/DL (ref 3.5–5.5)
ALBUMIN/GLOB SERPL: 2 {RATIO} (ref 1.2–2.2)
ALP SERPL-CCNC: 44 IU/L (ref 39–117)
ALT SERPL-CCNC: 20 IU/L (ref 0–32)
APPEARANCE UR: CLEAR
AST SERPL-CCNC: 18 IU/L (ref 0–40)
BACTERIA #/AREA URNS HPF: ABNORMAL /[HPF]
BASOPHILS # BLD AUTO: 0 X10E3/UL (ref 0–0.2)
BASOPHILS NFR BLD AUTO: 1 %
BILIRUB SERPL-MCNC: 0.7 MG/DL (ref 0–1.2)
BILIRUB UR QL STRIP: NEGATIVE
BUN SERPL-MCNC: 13 MG/DL (ref 6–20)
BUN/CREAT SERPL: 19 (ref 9–23)
CALCIUM SERPL-MCNC: 9.5 MG/DL (ref 8.7–10.2)
CASTS URNS QL MICRO: ABNORMAL /LPF
CHLORIDE SERPL-SCNC: 104 MMOL/L (ref 96–106)
CHOLEST SERPL-MCNC: 138 MG/DL (ref 100–199)
CO2 SERPL-SCNC: 23 MMOL/L (ref 20–29)
COLOR UR: YELLOW
CREAT SERPL-MCNC: 0.67 MG/DL (ref 0.57–1)
EOSINOPHIL # BLD AUTO: 0 X10E3/UL (ref 0–0.4)
EOSINOPHIL NFR BLD AUTO: 1 %
EPI CELLS #/AREA URNS HPF: ABNORMAL /HPF (ref 0–10)
ERYTHROCYTE [DISTWIDTH] IN BLOOD BY AUTOMATED COUNT: 12.7 % (ref 12.3–15.4)
EST. AVERAGE GLUCOSE BLD GHB EST-MCNC: 100 MG/DL
GLOBULIN SER CALC-MCNC: 2.4 G/DL (ref 1.5–4.5)
GLUCOSE SERPL-MCNC: 93 MG/DL (ref 65–99)
GLUCOSE UR QL: NEGATIVE
HBA1C MFR BLD: 5.1 % (ref 4.8–5.6)
HCT VFR BLD AUTO: 39.2 % (ref 34–46.6)
HDLC SERPL-MCNC: 36 MG/DL
HGB BLD-MCNC: 13.4 G/DL (ref 11.1–15.9)
HGB UR QL STRIP: ABNORMAL
IMM GRANULOCYTES # BLD AUTO: 0 X10E3/UL (ref 0–0.1)
IMM GRANULOCYTES NFR BLD AUTO: 0 %
INTERPRETATION, 910389: NORMAL
KETONES UR QL STRIP: NEGATIVE
LDLC SERPL CALC-MCNC: 92 MG/DL (ref 0–99)
LEUKOCYTE ESTERASE UR QL STRIP: ABNORMAL
LYMPHOCYTES # BLD AUTO: 0.8 X10E3/UL (ref 0.7–3.1)
LYMPHOCYTES NFR BLD AUTO: 27 %
MCH RBC QN AUTO: 29.6 PG (ref 26.6–33)
MCHC RBC AUTO-ENTMCNC: 34.2 G/DL (ref 31.5–35.7)
MCV RBC AUTO: 87 FL (ref 79–97)
MICRO URNS: ABNORMAL
MONOCYTES # BLD AUTO: 0.2 X10E3/UL (ref 0.1–0.9)
MONOCYTES NFR BLD AUTO: 5 %
NEUTROPHILS # BLD AUTO: 2 X10E3/UL (ref 1.4–7)
NEUTROPHILS NFR BLD AUTO: 66 %
NITRITE UR QL STRIP: NEGATIVE
PH UR STRIP: 6.5 [PH] (ref 5–7.5)
PLATELET # BLD AUTO: 234 X10E3/UL (ref 150–450)
POTASSIUM SERPL-SCNC: 3.8 MMOL/L (ref 3.5–5.2)
PROT SERPL-MCNC: 7.1 G/DL (ref 6–8.5)
PROT UR QL STRIP: NEGATIVE
RBC # BLD AUTO: 4.52 X10E6/UL (ref 3.77–5.28)
RBC #/AREA URNS HPF: ABNORMAL /HPF (ref 0–2)
SODIUM SERPL-SCNC: 142 MMOL/L (ref 134–144)
SP GR UR: <=1.005 (ref 1–1.03)
TRIGL SERPL-MCNC: 50 MG/DL (ref 0–149)
TSH SERPL DL<=0.005 MIU/L-ACNC: 0.92 UIU/ML (ref 0.45–4.5)
UROBILINOGEN UR STRIP-MCNC: 0.2 MG/DL (ref 0.2–1)
VLDLC SERPL CALC-MCNC: 10 MG/DL (ref 5–40)
WBC # BLD AUTO: 3 X10E3/UL (ref 3.4–10.8)
WBC #/AREA URNS HPF: ABNORMAL /HPF (ref 0–5)

## 2019-09-25 DIAGNOSIS — R79.89 ABNORMAL CBC MEASUREMENT: ICD-10-CM

## 2019-09-25 DIAGNOSIS — R31.9 HEMATURIA, UNSPECIFIED TYPE: Primary | ICD-10-CM

## 2019-09-27 ENCOUNTER — TELEPHONE (OUTPATIENT)
Dept: INTERNAL MEDICINE CLINIC | Age: 34
End: 2019-09-27

## 2019-09-27 DIAGNOSIS — R31.9 HEMATURIA, UNSPECIFIED TYPE: Primary | ICD-10-CM

## 2019-09-27 NOTE — TELEPHONE ENCOUNTER
Spoke with patient after verifying her name and . Patient called to states that she was not on her menstrual cycle while having labs done wants to know if she should have her urine retested and when.

## 2019-09-27 NOTE — TELEPHONE ENCOUNTER
Spoke with patient and advised to have urine rechecked in 2 weeks and if patient is having menstrual cycle wait 1 week after ir has resolved. Patient also advised office is moving to 2nd floor. Patient verbalized an understanding.

## 2019-09-27 NOTE — TELEPHONE ENCOUNTER
Patient would like a call back to discuss the lab letter that she received. Please call her back at [phone]

## 2019-09-27 NOTE — TELEPHONE ENCOUNTER
Should recheck in 2 weeks. If on her period, wait until 1 week after resolves. I placed an order- could you pls let her know? Thanks!     Bonnie Barrera NP

## 2019-10-15 ENCOUNTER — TELEPHONE (OUTPATIENT)
Dept: INTERNAL MEDICINE CLINIC | Age: 34
End: 2019-10-15

## 2019-10-15 DIAGNOSIS — N30.90 CYSTITIS: Primary | ICD-10-CM

## 2019-10-15 LAB
APPEARANCE UR: ABNORMAL
BACTERIA #/AREA URNS HPF: ABNORMAL /[HPF]
BASOPHILS # BLD AUTO: 0 X10E3/UL (ref 0–0.2)
BASOPHILS NFR BLD AUTO: 0 %
BILIRUB UR QL STRIP: NEGATIVE
CASTS URNS QL MICRO: ABNORMAL /LPF
COLOR UR: YELLOW
EOSINOPHIL # BLD AUTO: 0.1 X10E3/UL (ref 0–0.4)
EOSINOPHIL NFR BLD AUTO: 2 %
EPI CELLS #/AREA URNS HPF: ABNORMAL /HPF (ref 0–10)
ERYTHROCYTE [DISTWIDTH] IN BLOOD BY AUTOMATED COUNT: 12 % (ref 12.3–15.4)
GLUCOSE UR QL: NEGATIVE
HCT VFR BLD AUTO: 41.6 % (ref 34–46.6)
HGB BLD-MCNC: 13.9 G/DL (ref 11.1–15.9)
HGB UR QL STRIP: ABNORMAL
IMM GRANULOCYTES # BLD AUTO: 0 X10E3/UL (ref 0–0.1)
IMM GRANULOCYTES NFR BLD AUTO: 0 %
KETONES UR QL STRIP: NEGATIVE
LEUKOCYTE ESTERASE UR QL STRIP: ABNORMAL
LYMPHOCYTES # BLD AUTO: 1.5 X10E3/UL (ref 0.7–3.1)
LYMPHOCYTES NFR BLD AUTO: 26 %
MCH RBC QN AUTO: 29.6 PG (ref 26.6–33)
MCHC RBC AUTO-ENTMCNC: 33.4 G/DL (ref 31.5–35.7)
MCV RBC AUTO: 89 FL (ref 79–97)
MICRO URNS: ABNORMAL
MONOCYTES # BLD AUTO: 0.4 X10E3/UL (ref 0.1–0.9)
MONOCYTES NFR BLD AUTO: 6 %
NEUTROPHILS # BLD AUTO: 3.7 X10E3/UL (ref 1.4–7)
NEUTROPHILS NFR BLD AUTO: 66 %
NITRITE UR QL STRIP: NEGATIVE
PH UR STRIP: 5.5 [PH] (ref 5–7.5)
PLATELET # BLD AUTO: 244 X10E3/UL (ref 150–450)
PROT UR QL STRIP: NEGATIVE
RBC # BLD AUTO: 4.7 X10E6/UL (ref 3.77–5.28)
RBC #/AREA URNS HPF: ABNORMAL /HPF (ref 0–2)
SP GR UR: 1.01 (ref 1–1.03)
UROBILINOGEN UR STRIP-MCNC: 0.2 MG/DL (ref 0.2–1)
WBC # BLD AUTO: 5.7 X10E3/UL (ref 3.4–10.8)
WBC #/AREA URNS HPF: >30 /HPF (ref 0–5)

## 2019-10-15 RX ORDER — CIPROFLOXACIN 500 MG/1
500 TABLET ORAL 2 TIMES DAILY
Qty: 10 TAB | Refills: 0 | Status: SHIPPED | OUTPATIENT
Start: 2019-10-15 | End: 2019-10-20

## 2019-10-15 NOTE — TELEPHONE ENCOUNTER
----- Message from Mirella Keller NP sent at 10/15/2019  7:48 AM EDT -----  Could you pls call pt and let her know looks like she has a UTI- script for ciprofloxacin 500mg bid x 5 days sent to her pharmacy    Also could you please call lab [facility] and add on UCX? Dx Cystitis    Thanks!     Mirella Keller NP    ----- Message -----  From: Wilma Nagel Lab Results In  Sent: 10/15/2019   5:40 AM EDT  To: Mirella Keller NP

## 2019-10-15 NOTE — TELEPHONE ENCOUNTER
Called and spoke with patient after verifying name and . Notified patient of lab results and recommendations from provider. Patient was given a chance to ask questions and verbalized an understanding of the information. Called [facility] to add on urine culture. Spoke with Shahla Mtz who states they do not have the right sample to add on urine culture.

## 2019-10-15 NOTE — PROGRESS NOTES
Called and spoke with patient after verifying name and . Notified patient of lab results and recommendations from provider. Patient was given a chance to ask questions and verbalized an understanding of the information. Called [facility] to add on urine culture. Spoke with Elen Police who states they do not have the right sample to add on urine culture.

## 2019-12-24 ENCOUNTER — OFFICE VISIT (OUTPATIENT)
Dept: PRIMARY CARE CLINIC | Age: 34
End: 2019-12-24

## 2019-12-24 VITALS
HEART RATE: 61 BPM | WEIGHT: 121.4 LBS | BODY MASS INDEX: 22.92 KG/M2 | SYSTOLIC BLOOD PRESSURE: 118 MMHG | RESPIRATION RATE: 14 BRPM | DIASTOLIC BLOOD PRESSURE: 81 MMHG | HEIGHT: 61 IN | TEMPERATURE: 98.2 F

## 2019-12-24 DIAGNOSIS — E55.9 VITAMIN D DEFICIENCY: ICD-10-CM

## 2019-12-24 DIAGNOSIS — Z76.89 ESTABLISHING CARE WITH NEW DOCTOR, ENCOUNTER FOR: ICD-10-CM

## 2019-12-24 DIAGNOSIS — R22.42 MASS OF LEFT HIP REGION: Primary | ICD-10-CM

## 2019-12-24 NOTE — PROGRESS NOTES
Chief Complaint   Patient presents with   [address]     Patient needs a PCP; Previously a patient of Dr. Celine Bueno Cyst     buttocks     1. Have you been to the ER, urgent care clinic since your last visit? Hospitalized since your last visit? No    2. Have you seen or consulted any other health care providers outside of the [address] [place] [address] since your last visit? Include any pap smears or colon screening.  No

## 2019-12-24 NOTE — PROGRESS NOTES
Subjective:     Chief Complaint   Patient presents with   [facility] Arita Establish Care     Patient needs a PCP; Previously a patient of Dr. Jerzy Ruiz     buttocks          Jayda interpretor # [id number]    She  is a 29y.o. year old female from [place] who presents as a new patient to [facility]. Her medical hx is significant for low vit d. She is not taking any Vit d supplement currently. She would like to have the level checked. Patient is here with a concern about a painful mass in her left buttock for a while. Reports that some days its more painful and some days fine. Tender to touch. Lab Results   Component Value Date/Time    VITAMIN D, 25-HYDROXY 14.7 (L) 03/06/2019 08:51 AM           A comprehensive review of systems was negative except for that written in the HPI.   Objective:     Vitals:    12/24/19 0803   BP: 118/81   Pulse: 61   Resp: 14   Temp: 98.2 °F (36.8 °C)   TempSrc: Oral   Weight: 121 lb 6.4 oz (55.1 kg)   Height: 5' 1\" (1.549 m)       Physical Examination: General appearance - alert, well appearing, and in no distress, oriented to person, place, and time and normal appearing weight  Mental status - alert, oriented to person, place, and time, normal mood, behavior, speech, dress, motor activity, and thought processes  Ears - bilateral TM's and external ear canals normal  Nose - normal and patent, no erythema, discharge or polyps  Mouth - mucous membranes moist, pharynx normal without lesions  Neck - supple, no significant adenopathy, thyroid exam: thyroid is normal in size without nodules or tenderness  Chest - clear to auscultation, no wheezes, rales or rhonchi, symmetric air entry  Heart - normal rate, regular rhythm, normal S1, S2, no murmurs, rubs, clicks or gallops  Abdomen - soft, nontender, nondistended, no masses or organomegaly  Back exam - full range of motion, no tenderness, palpable spasm or pain on motion  Neurological - alert, oriented, normal speech, no focal findings or movement disorder noted  Extremities - no pedal edema noted  Skin: there is a 1 cm, slightly tender, circular, smooth bordered mass noted in left buttock near the sciatic area. No Known Allergies   Social History     Socioeconomic History    Marital status:      Spouse name: Not on file    Number of children: Not on file    Years of education: Not on file    Highest education level: Not on file   Tobacco Use    Smoking status: Never Smoker    Smokeless tobacco: Never Used   Substance and Sexual Activity    Alcohol use: No    Drug use: No    Sexual activity: Yes     Partners: Male     Birth control/protection: None      Family History   Problem Relation Age of Onset    No Known Problems Mother     No Known Problems Father       Past Surgical History:   Procedure Laterality Date    HX GYN      x3 vaginal deliveries      History reviewed. No pertinent past medical history. Current Outpatient Medications   Medication Sig Dispense Refill    methylPREDNISolone (MEDROL DOSEPACK) 4 mg tablet As directed 1 Dose Pack 0    levonorgestrel (MIRENA) 20 mcg/24 hr (5 years) IUD 1 Device by IntraUTERine route once. Assessment/ Plan:   Diagnoses and all orders for this visit:    1. Mass of left hip region  -  D/D is cyst, lipoma, unknown pathology. US HEAD NECK SOFT TISSUE; Future    2. Vitamin D deficiency  -     VITAMIN D, 25 HYDROXY             Will call with result and recommendations. 3. Establishing care with new doctor, encounter for           Medication risks/benefits/costs/interactions/alternatives discussed with patient. Advised patient to call back or return to office if symptoms worsen/change/persist. If patient cannot reach us or should anything more severe/urgent arise he/she should proceed directly to the nearest emergency department. Discussed expected course/resolution/complications of diagnosis in detail with patient.   Patient given a written after visit summary which includes her diagnoses, current medications and vitals. Patient expressed understanding with the diagnosis and plan. Follow-up and Dispositions    · Return if symptoms worsen or fail to improve.

## 2019-12-25 LAB — 25(OH)D3+25(OH)D2 SERPL-MCNC: 17.6 NG/ML (ref 30–100)

## 2019-12-26 ENCOUNTER — TELEPHONE (OUTPATIENT)
Dept: PRIMARY CARE CLINIC | Age: 34
End: 2019-12-26

## 2019-12-26 DIAGNOSIS — E55.9 VITAMIN D DEFICIENCY: Primary | ICD-10-CM

## 2019-12-26 RX ORDER — ASPIRIN 325 MG
50000 TABLET, DELAYED RELEASE (ENTERIC COATED) ORAL
Qty: 8 CAP | Refills: 0 | Status: SHIPPED | OUTPATIENT
Start: 2019-12-26 | End: 2020-01-15 | Stop reason: SDUPTHER

## 2019-12-26 NOTE — TELEPHONE ENCOUNTER
----- Message from Denise Smith MD sent at 12/26/2019  7:54 AM EST -----  Please call and mail letter:    Vit d level is low. Start taking Vit D 50,000 IU one tab per week for 8 weeks followed by OTC 2,000 IU vit D daily. Increase vit D rich food. Exposed to sun total of 60 minutes /week will help. Will recheck level in 6 months. Prescription sent to his pharmacy.

## 2019-12-26 NOTE — PROGRESS NOTES
Please call and mail letter:    Vit d level is low. Start taking Vit D 50,000 IU one tab per week for 8 weeks followed by OTC 2,000 IU vit D daily. Increase vit D rich food. Exposed to sun total of 60 minutes /week will help. Will recheck level in 6 months. Prescription sent to his pharmacy.

## 2019-12-31 ENCOUNTER — HOSPITAL ENCOUNTER (OUTPATIENT)
Dept: ULTRASOUND IMAGING | Age: 34
Discharge: HOME | End: 2019-12-31
Attending: FAMILY MEDICINE
Payer: MEDICAID

## 2019-12-31 DIAGNOSIS — R22.42 MASS OF LEFT HIP REGION: ICD-10-CM

## 2019-12-31 PROCEDURE — 76882 US LMTD JT/FCL EVL NVASC XTR: CPT

## 2020-01-02 ENCOUNTER — TELEPHONE (OUTPATIENT)
Dept: PRIMARY CARE CLINIC | Age: 35
End: 2020-01-02

## 2020-01-02 DIAGNOSIS — R93.89 ABNORMAL ULTRASOUND: ICD-10-CM

## 2020-01-02 DIAGNOSIS — R22.42 MASS OF LEFT HIP REGION: Primary | ICD-10-CM

## 2020-01-02 NOTE — PROGRESS NOTES
Please call patient; There is a non specific mass noted in that area she is complaining about. Need further evaluation. MRI ordered. We will let her know the plan after we get the the MRI results.

## 2020-01-02 NOTE — TELEPHONE ENCOUNTER
----- Message from Sylvia Melton MD sent at 1/2/2020  8:33 AM EST -----  Please call patient; There is a non specific mass noted in that area she is complaining about. Need further evaluation. MRI ordered. We will let her know the plan after we get the the MRI results.

## 2020-01-02 NOTE — TELEPHONE ENCOUNTER
Discussed results with patient. Advised to be on the look out for a phone call from central scheduling for MRI. Pt verbalized her understanding.

## 2020-01-03 DIAGNOSIS — R22.42 MASS OF LEFT HIP REGION: Primary | ICD-10-CM

## 2020-01-08 ENCOUNTER — TELEPHONE (OUTPATIENT)
Dept: PRIMARY CARE CLINIC | Age: 35
End: 2020-01-08

## 2020-01-08 NOTE — TELEPHONE ENCOUNTER
Kvng Watson from [address] called requesting a new order for a Hip MRI w/contrast and w/mass.     For insurance reasons the new order needs to read:    MRI Hip with and without contrast    Kvng Watson  [phone]  FAX: [phone]    Patient's appt is tomorrow 1/9/20

## 2020-01-08 NOTE — TELEPHONE ENCOUNTER
Mercedes Miranda from [address] called requesting a new order for a Hip MRI w/contrast and w/mass.     For insurance reasons the new order needs to read:     MRI Hip with and without contrast     Mercedes Miranda  [phone]  FAX: [phone]     Patient's appt is tomorrow 1/9/20 at [address] [place] [address]

## 2020-01-09 ENCOUNTER — HOSPITAL ENCOUNTER (OUTPATIENT)
Dept: MRI IMAGING | Age: 35
Discharge: HOME | End: 2020-01-09
Payer: MEDICAID

## 2020-01-09 ENCOUNTER — TELEPHONE (OUTPATIENT)
Dept: PRIMARY CARE CLINIC | Age: 35
End: 2020-01-09

## 2020-01-09 DIAGNOSIS — R22.42 MASS OF LEFT HIP REGION: Primary | ICD-10-CM

## 2020-01-09 DIAGNOSIS — R22.42 MASS OF LEFT HIP REGION: ICD-10-CM

## 2020-01-09 PROCEDURE — 73723 MRI JOINT LWR EXTR W/O&W/DYE: CPT

## 2020-01-09 RX ORDER — GADOTERATE MEGLUMINE 376.9 MG/ML
12 INJECTION INTRAVENOUS
Status: COMPLETED | OUTPATIENT
Start: 2020-01-09 | End: 2020-01-09

## 2020-01-09 RX ADMIN — GADOTERATE MEGLUMINE 12 ML: 376.9 INJECTION INTRAVENOUS at 12:00

## 2020-01-09 NOTE — PROGRESS NOTES
Please call patient;:    I think its better she has an appointment with me to discuss the MRI result in person since there is some language barrier.

## 2020-01-09 NOTE — TELEPHONE ENCOUNTER
(Late documentation) This is the correct side and I have spoken with the MRI department this morning and placed a new order per their request, it needed to be w/wo contrast. I did that this morning and imaging was done. Results have been reviewed by Dr. Breana Robb. Please see other encounter.

## 2020-01-15 ENCOUNTER — OFFICE VISIT (OUTPATIENT)
Dept: PRIMARY CARE CLINIC | Age: 35
End: 2020-01-15

## 2020-01-15 VITALS
TEMPERATURE: 98.2 F | DIASTOLIC BLOOD PRESSURE: 59 MMHG | BODY MASS INDEX: 23.3 KG/M2 | WEIGHT: 123.4 LBS | RESPIRATION RATE: 16 BRPM | SYSTOLIC BLOOD PRESSURE: 96 MMHG | HEART RATE: 65 BPM | HEIGHT: 61 IN | OXYGEN SATURATION: 100 %

## 2020-01-15 DIAGNOSIS — M79.89 FAT NECROSIS: Primary | ICD-10-CM

## 2020-01-15 DIAGNOSIS — E55.9 VITAMIN D DEFICIENCY: ICD-10-CM

## 2020-01-15 RX ORDER — ASPIRIN 325 MG
50000 TABLET, DELAYED RELEASE (ENTERIC COATED) ORAL
Qty: 4 CAP | Refills: 0 | Status: SHIPPED | OUTPATIENT
Start: 2020-01-15 | End: 2020-06-22

## 2020-01-15 NOTE — PROGRESS NOTES
Margo Pan is a 29 y.o. female    Chief Complaint   Patient presents with    Results     MRI results. 1. Have you been to the ER, urgent care clinic since your last visit? Hospitalized since your last visit? No    2. Have you seen or consulted any other health care providers outside of the [address] [place] [address] since your last visit? Include any pap smears or colon screening. No    No flowsheet data found. There are no preventive care reminders to display for this patient.

## 2020-01-15 NOTE — PROGRESS NOTES
Subjective:     Chief Complaint   Patient presents with    Results     MRI results. Jayda interpretor # [id number]    She  is a 29y.o. year old female who presents today to discuss MRI results. Hx of several injection in that area left hip in [place] about 4 years ago other than that no trauma. MRI on 1/9/2020 showed:     2.6 x 1.5 x 1.3 cm mass with central fat in the subcutaneous adipose tissues of  the left buttock most likely represents fat necrosis. However, in the absence of  injury, fat-containing soft tissue neoplasm cannot be excluded. Consider  surgical resection versus imaging follow-up. Need refill of Vit d. Pertinent items are noted in HPI. Objective:     Vitals:    01/15/20 1429   BP: 96/59   Pulse: 65   Resp: 16   Temp: 98.2 °F (36.8 °C)   TempSrc: Oral   SpO2: 100%   Weight: 123 lb 6.4 oz (56 kg)   Height: 5' 1\" (1.549 m)       Physical Examination: General appearance - alert, well appearing, and in no distress, oriented to person, place, and time and normal appearing weight  Mental status - alert, oriented to person, place, and time  Back exam - full range of motion, no tenderness, palpable spasm or pain on motion, there is an about 1 cm, tender, circular, mass noted in left buttock near the sciatic area.      No Known Allergies   Social History     Socioeconomic History    Marital status:      Spouse name: Not on file    Number of children: Not on file    Years of education: Not on file    Highest education level: Not on file   Tobacco Use    Smoking status: Never Smoker    Smokeless tobacco: Never Used   Substance and Sexual Activity    Alcohol use: No    Drug use: No    Sexual activity: Yes     Partners: Male     Birth control/protection: None      Family History   Problem Relation Age of Onset    No Known Problems Mother     No Known Problems Father       Past Surgical History:   Procedure Laterality Date    HX GYN      x3 vaginal deliveries      History reviewed. No pertinent past medical history. Current Outpatient Medications   Medication Sig Dispense Refill    cholecalciferol (VITAMIN D3) (50,000 UNITS /1250 MCG) capsule Take 1 Cap by mouth every seven (7) days. 8 Cap 0    levonorgestrel (MIRENA) 20 mcg/24 hr (5 years) IUD 1 Device by IntraUTERine route once. Assessment/ Plan:   Diagnoses and all orders for this visit:    1. Fat necrosis  -   counseling and discussed about the MRI finding with her      REFERRAL TO GENERAL SURGERY              Has appointment on 1/29/2020 with Dr. Sreekanth Curiel. 2. Vitamin D deficiency  -     cholecalciferol (VITAMIN D3) (50,000 UNITS /1250 MCG) capsule; Take 1 Cap by mouth every seven (7) days. Medication risks/benefits/costs/interactions/alternatives discussed with patient. Advised patient to call back or return to office if symptoms worsen/change/persist. If patient cannot reach us or should anything more severe/urgent arise he/she should proceed directly to the nearest emergency department. Discussed expected course/resolution/complications of diagnosis in detail with patient. Patient given a written after visit summary which includes her diagnoses, current medications and vitals. Patient expressed understanding with the diagnosis and plan. Follow-up and Dispositions    · Return if symptoms worsen or fail to improve.

## 2020-01-27 ENCOUNTER — OFFICE VISIT (OUTPATIENT)
Dept: SURGERY | Age: 35
End: 2020-01-27

## 2020-01-27 VITALS
OXYGEN SATURATION: 98 % | TEMPERATURE: 98.6 F | BODY MASS INDEX: 23.6 KG/M2 | SYSTOLIC BLOOD PRESSURE: 100 MMHG | HEART RATE: 73 BPM | HEIGHT: 61 IN | RESPIRATION RATE: 16 BRPM | DIASTOLIC BLOOD PRESSURE: 60 MMHG | WEIGHT: 125 LBS

## 2020-01-27 DIAGNOSIS — R22.42 MASS OF LEFT HIP REGION: Primary | ICD-10-CM

## 2020-01-27 NOTE — LETTER
1/27/20 Patient: Ashish García YOB: 1985 Date of Visit: 1/27/2020 Horace Yuan MD 
[address] [place] [id number] VIA In Basket Dear Horace Yuan MD, Thank you for referring Ms. Ashish García to [facility] for evaluation. My notes for this consultation are attached. If you have questions, please do not hesitate to call me. I look forward to following your patient along with you. Sincerely, Jigna Hansen MD

## 2020-01-27 NOTE — PROGRESS NOTES
1. Have you been to the ER, urgent care clinic since your last visit? Hospitalized since your last visit? No    2. Have you seen or consulted any other health care providers outside of the [address] [place] [address] since your last visit? Include any pap smears or colon screening.   No    Blue phone used- # [phone]

## 2020-04-29 NOTE — PROGRESS NOTES
Subjective:      Homero Anne  is a 29 y.o.  (Jayda speaking) female from [place] who was referred by Dr. Kati Ca for evaluation of LEFT hip fat necrosis. Pt reports history of injections in that area many years ago in [place]. Pt notes area is minimally tender to touch. Pt had LEFT hip MRI showing 2.6 x 1.5 x 1.3 cm mass with central fat in the subcutaneous adipose tissues of the LEFT buttock. A  is being utilized via telephone to help pt communicate. Past Medical History:   Diagnosis Date    Acid indigestion     Back pain     Frequent headaches        Past Surgical History:   Procedure Laterality Date    HX GYN      x3 vaginal deliveries       Social History     Tobacco Use    Smoking status: Never Smoker    Smokeless tobacco: Never Used   Substance Use Topics    Alcohol use: No       Family History   Problem Relation Age of Onset    Cancer Mother     Hypertension Mother     Hypertension Father        Current Outpatient Medications on File Prior to Visit   Medication Sig Dispense Refill    cholecalciferol (VITAMIN D3) (50,000 UNITS /1250 MCG) capsule Take 1 Cap by mouth every seven (7) days. 4 Cap 0    levonorgestrel (MIRENA) 20 mcg/24 hr (5 years) IUD 1 Device by IntraUTERine route once. No current facility-administered medications on file prior to visit. No Known Allergies    Review of Systems:    A comprehensive review of systems was negative except for that written in the History of Present Illness. Objective:     Visit Vitals  /60 (BP 1 Location: Right arm, BP Patient Position: Sitting)   Pulse 73   Temp 98.6 °F (37 °C) (Oral)   Resp 16   Ht 5' 1\" (1.549 m)   Wt 125 lb (56.7 kg)   SpO2 98%   BMI 23.62 kg/m²        Physical Exam:  SKIN: 2 cm firm tender nodule in the LEFT gluteal subcutaneous tissues. Labs: No results found for this or any previous visit (from the past 24 hour(s)).     Data Review:      US 12/31/19  IMPRESSION:  2.8 x 1.4 x 1.3 cm nonspecific mass in the subcutaneous adipose tissues accounts for the palpable finding. This does not represent a simple cyst or simple lipoma. Recommend MRI with IV contrast to further evaluate and determine need for resection. MRI LEFT hip 01/09/20:  IMPRESSION: 2.6 x 1.5 x 1.3 cm mass with central fat in the subcutaneous adipose tissues of the left buttock most likely represents fat necrosis. However, in the absence of injury, fat-containing soft tissue neoplasm cannot be excluded. Consider surgical resection versus imaging follow-up    Assessment and Plan:       ICD-10-CM ICD-9-CM    1. Mass of left hip region R22.42 719.65        Pt has 2 cm area of fat necrosis in the LEFT gluteal region. Reviewed this can be observed as long as mass is not enlarging or affecting ADLs. F/U PRN. All questions were answered and pt is in agreement with this plan. This document was scribed by Johnny Thomas as dictated by Dr. Pavel [place].      Signed By: Katheran Oppenheim, MD     01/27/20 Patient is 17 y/o F with Hx of bipolar depression and obesity admitted for intentional acute acetaminophen ingestion. Took 87 500-mg tablets. LFTs wnl at 2 hours post-ingestion. EKG wnl. S/p charcoal and started NAC per protocol due to acetaminophen level above threshold at 4 hours post-ingestion. Nausea after charcoal but otherwise asymptomatic. Denies active SI/HI. Will continue 21-hour NAC protocol per toxicology recs with labs at 19 hours of treatment. Currently stable.    #Acute acetaminophen toxicity  - IV NAC is as follows:      - 1st dose: 150mg/kg over 1 hour      - 2nd dose: 50mg/kg over 4 hours      - 3rd dose: 100mg/kg over 16 hours  - LFTs, PT/INR, VBG and acetaminophen (APAP) level 2 hours before completion of 3rd dose  - Stop or continue NAC pending above lab results (see toxicology recs)    #Bipolar depression  - 1:1 supervision  - Consider psychiatry evaluation    #FEN/GI  - Regular diet  - Zofran PRN

## 2020-06-09 ENCOUNTER — TELEPHONE (OUTPATIENT)
Dept: PRIMARY CARE CLINIC | Age: 35
End: 2020-06-09

## 2020-06-09 NOTE — TELEPHONE ENCOUNTER
Patient needs appointment for the medication she is requesting.  I have called her and made appt for 6/11/2020 per her request.

## 2020-06-09 NOTE — TELEPHONE ENCOUNTER
Patient has a medication that need's to be sent to the pharmacy? Patient is claiming that she has an infection? Having communication issues with the phone. Would like to speak with a nurse. Fluconazole? All I could gather from the call.

## 2020-06-11 ENCOUNTER — OFFICE VISIT (OUTPATIENT)
Dept: PRIMARY CARE CLINIC | Age: 35
End: 2020-06-11

## 2020-06-11 VITALS
RESPIRATION RATE: 18 BRPM | SYSTOLIC BLOOD PRESSURE: 111 MMHG | HEIGHT: 61 IN | HEART RATE: 77 BPM | DIASTOLIC BLOOD PRESSURE: 69 MMHG | TEMPERATURE: 97.3 F | OXYGEN SATURATION: 97 % | WEIGHT: 127 LBS | BODY MASS INDEX: 23.98 KG/M2

## 2020-06-11 DIAGNOSIS — N89.8 VAGINAL DISCHARGE: Primary | ICD-10-CM

## 2020-06-11 DIAGNOSIS — N89.8 VAGINAL ITCHING: ICD-10-CM

## 2020-06-11 RX ORDER — LEVONORGESTREL 52 MG/1
1 INTRAUTERINE DEVICE INTRAUTERINE ONCE
COMMUNITY

## 2020-06-11 RX ORDER — FLUCONAZOLE 150 MG/1
TABLET ORAL
Qty: 2 TAB | Refills: 0 | Status: SHIPPED | OUTPATIENT
Start: 2020-06-11 | End: 2021-01-21 | Stop reason: SDUPTHER

## 2020-06-11 NOTE — PROGRESS NOTES
Subjective:     Chief Complaint   Patient presents with    Yeast Infection        She  is a 29y.o. year old female who presents with a c/o vaginal itching, greenish vaginal discharge for the past 3-4 days. Reports that she had same symptoms in the past and she was diagnosed with yeast infection. Denies any urinary symptoms, vaginal pain. . She is on Mirena. Pertinent items are noted in HPI. Objective:     Vitals:    06/11/20 1047   BP: 111/69   Pulse: 77   Resp: 18   Temp: 97.3 °F (36.3 °C)   TempSrc: Temporal   SpO2: 97%   Weight: 127 lb (57.6 kg)   Height: 5' 1\" (1.549 m)       Physical Examination: General appearance - alert, well appearing, and in no distress, oriented to person, place, and time and normal appearing weight  Mental status - alert, oriented to person, place, and time, normal mood, behavior, speech, dress, motor activity, and thought processes  Pelvic - VULVA: normal appearing vulva with no masses, tenderness or lesions, VAGINA: vaginal discharge - greenish and odorless, No swelling. CERVIX: normal appearing cervix without discharge or lesions, UTERUS: uterus is normal size, shape, consistency and nontender, ADNEXA: normal adnexa in size, nontender and no masses, exam chaperoned by Lennox Mars.     No Known Allergies   Social History     Socioeconomic History    Marital status:      Spouse name: Not on file    Number of children: Not on file    Years of education: Not on file    Highest education level: Not on file   Tobacco Use    Smoking status: Never Smoker    Smokeless tobacco: Never Used   Substance and Sexual Activity    Alcohol use: No    Drug use: No    Sexual activity: Yes     Partners: Male     Birth control/protection: None      Family History   Problem Relation Age of Onset    Cancer Mother     Hypertension Mother     Hypertension Father       Past Surgical History:   Procedure Laterality Date    HX GYN      x3 vaginal deliveries      Past Medical History:   Diagnosis Date    Acid indigestion     Back pain     Frequent headaches       Current Outpatient Medications   Medication Sig Dispense Refill    fluconazole (DIFLUCAN) 150 mg tablet Take one tab today. Okay to take one more tab in 4-5 days if symptoms does not resolve. 2 Tab 0    levonorgestreL (Mirena) 20 mcg/24 hours (5 yrs) 52 mg IUD 1 Device by IntraUTERine route once.  cholecalciferol (VITAMIN D3) (50,000 UNITS /1250 MCG) capsule Take 1 Cap by mouth every seven (7) days. 4 Cap 0        Assessment/ Plan:   Diagnoses and all orders for this visit:    1. Vaginal discharge  -     NUSWAB VAGINITIS PLUS  -     fluconazole (DIFLUCAN) 150 mg tablet; Take one tab today. Okay to take one more tab in 4-5 days if symptoms does not resolve. Will call with result and further recommendation otherwise start taking Diflucan. 2. Vaginal itching  -     NUSWAB VAGINITIS PLUS  -     fluconazole (DIFLUCAN) 150 mg tablet; Take one tab today. Okay to take one more tab in 4-5 days if symptoms does not resolve. Medication risks/benefits/costs/interactions/alternatives discussed with patient. Advised patient to call back or return to office if symptoms worsen/change/persist. If patient cannot reach us or should anything more severe/urgent arise he/she should proceed directly to the nearest emergency department. Discussed expected course/resolution/complications of diagnosis in detail with patient. Patient given a written after visit summary which includes her diagnoses, current medications and vitals. Patient expressed understanding with the diagnosis and plan. Follow-up and Dispositions    · Return if symptoms worsen or fail to improve.

## 2020-06-18 LAB
A VAGINAE DNA VAG QL NAA+PROBE: NORMAL SCORE
BVAB2 DNA VAG QL NAA+PROBE: NORMAL SCORE
C ALBICANS DNA VAG QL NAA+PROBE: NEGATIVE
C GLABRATA DNA VAG QL NAA+PROBE: NEGATIVE
C TRACH DNA VAG QL NAA+PROBE: NEGATIVE
MEGA1 DNA VAG QL NAA+PROBE: NORMAL SCORE
N GONORRHOEA DNA VAG QL NAA+PROBE: NEGATIVE
T VAGINALIS DNA VAG QL NAA+PROBE: NEGATIVE

## 2020-06-22 DIAGNOSIS — E55.9 VITAMIN D DEFICIENCY: ICD-10-CM

## 2020-06-22 RX ORDER — ASPIRIN 325 MG
TABLET, DELAYED RELEASE (ENTERIC COATED) ORAL
Qty: 4 CAP | Refills: 0 | Status: SHIPPED | OUTPATIENT
Start: 2020-06-22 | End: 2020-07-16

## 2020-07-16 DIAGNOSIS — E55.9 VITAMIN D DEFICIENCY: ICD-10-CM

## 2020-07-16 RX ORDER — ASPIRIN 325 MG
TABLET, DELAYED RELEASE (ENTERIC COATED) ORAL
Qty: 4 CAP | Refills: 0 | Status: SHIPPED | OUTPATIENT
Start: 2020-07-16 | End: 2021-03-10 | Stop reason: DRUGHIGH

## 2021-01-21 ENCOUNTER — OFFICE VISIT (OUTPATIENT)
Dept: PRIMARY CARE CLINIC | Age: 36
End: 2021-01-21
Payer: MEDICAID

## 2021-01-21 VITALS
DIASTOLIC BLOOD PRESSURE: 70 MMHG | RESPIRATION RATE: 16 BRPM | SYSTOLIC BLOOD PRESSURE: 109 MMHG | TEMPERATURE: 98.5 F | BODY MASS INDEX: 23.94 KG/M2 | HEIGHT: 61 IN | HEART RATE: 62 BPM | OXYGEN SATURATION: 97 % | WEIGHT: 126.8 LBS

## 2021-01-21 DIAGNOSIS — R51.9 HEADACHE DISORDER: Primary | ICD-10-CM

## 2021-01-21 DIAGNOSIS — N89.8 VAGINAL ITCHING: ICD-10-CM

## 2021-01-21 DIAGNOSIS — N89.8 VAGINAL DISCHARGE: ICD-10-CM

## 2021-01-21 PROCEDURE — 99214 OFFICE O/P EST MOD 30 MIN: CPT | Performed by: FAMILY MEDICINE

## 2021-01-21 RX ORDER — FLUCONAZOLE 150 MG/1
TABLET ORAL
Qty: 2 TAB | Refills: 0 | Status: SHIPPED | OUTPATIENT
Start: 2021-01-21 | End: 2021-01-28

## 2021-01-21 RX ORDER — NAPROXEN 500 MG/1
500 TABLET ORAL
Qty: 30 TAB | Refills: 1 | Status: SHIPPED | OUTPATIENT
Start: 2021-01-21

## 2021-01-21 NOTE — PROGRESS NOTES
Subjective:     Chief Complaint   Patient presents with    Headache     2-3 months now, went to the Ed one time before         She  is a 28y.o. year old female who presents today with few concerns. Patient reports that for the past 2-3 months she has been experiencing intermittent sharp, dull HA in both side of the temple. 2-3 days/week. Feels nauseous sometimes. . Loud noise, children talking loud makes her HA worse. Last all day. Sleeping , tylenol,  rest helps. Went to [address] [place] [address] about 2-3 months ago for the same HA where head CT scan was negative. They gave her some HA med and send her home. She think staying home with three children and constantly monitoring them triggering the HA. No sleeping difficulties. Patient has Hx of yeast infection. She reports that she has been having vaginal itching and discharge recently. Denies any pelvic pain. Would like to get treatment. Pertinent items are noted in HPI. Objective:     Vitals:    01/21/21 1017   BP: 109/70   Pulse: 62   Resp: 16   Temp: 98.5 °F (36.9 °C)   TempSrc: Temporal   SpO2: 97%   Weight: 126 lb 12.8 oz (57.5 kg)   Height: 5' 1\" (1.549 m)       Physical Examination: General appearance - alert, well appearing, and in no distress, oriented to person, place, and time and normal appearing weight  Mental status - alert, oriented to person, place, and time, normal mood, behavior, speech, dress, motor activity, and thought processes.   Eye: Normal.   Ears - bilateral TM's and external ear canals normal  Nose - normal and patent, no erythema, discharge or polyps  Mouth - mucous membranes moist, pharynx normal without lesions  Neck - supple, no significant adenopathy  Chest - clear to auscultation, no wheezes, rales or rhonchi, symmetric air entry  Heart - normal rate, regular rhythm, normal S1, S2, no murmurs, rubs, clicks or gallops  Neurological - alert, oriented, normal speech, no focal findings or movement disorder noted    No Known Allergies Social History     Socioeconomic History    Marital status:      Spouse name: Not on file    Number of children: Not on file    Years of education: Not on file    Highest education level: Not on file   Tobacco Use    Smoking status: Never Smoker    Smokeless tobacco: Never Used   Substance and Sexual Activity    Alcohol use: No    Drug use: No    Sexual activity: Yes     Partners: Male     Birth control/protection: None      Family History   Problem Relation Age of Onset    Cancer Mother     Hypertension Mother     Hypertension Father       Past Surgical History:   Procedure Laterality Date    HX GYN      x3 vaginal deliveries      Past Medical History:   Diagnosis Date    Acid indigestion     Back pain     Frequent headaches     Headache       Current Outpatient Medications   Medication Sig Dispense Refill    levonorgestreL (Mirena) 20 mcg/24 hours (5 yrs) 52 mg IUD 1 Device by IntraUTERine route once.  cholecalciferol (VITAMIN D3) (50,000 UNITS /1250 MCG) capsule TAKE 1 CAPSULE BY MOUTH ONE TIME PER WEEK 4 Cap 0    fluconazole (DIFLUCAN) 150 mg tablet Take one tab today. Okay to take one more tab in 4-5 days if symptoms does not resolve. 2 Tab 0        Assessment/ Plan:   Diagnoses and all orders for this visit:    1. Headache disorder  -    Her symptoms are likely stress related HA. Had a negative head CT done. take as needed naproxen (NAPROSYN) 500 mg tablet; Take 1 Tab by mouth two (2) times daily as needed for Pain or Headache. Follow up if symptoms not better. Conservative management discussed. 2. Vaginal discharge  -     fluconazole (DIFLUCAN) 150 mg tablet; Take one tab today. Okay to take one more tab in 4-5 days if symptoms does not resolve. 3. Vaginal itching  -     fluconazole (DIFLUCAN) 150 mg tablet; Take one tab today. Okay to take one more tab in 4-5 days if symptoms does not resolve.            Medication risks/benefits/costs/interactions/alternatives discussed with patient. Advised patient to call back or return to office if symptoms worsen/change/persist. If patient cannot reach us or should anything more severe/urgent arise he/she should proceed directly to the nearest emergency department. Discussed expected course/resolution/complications of diagnosis in detail with patient. Patient given a written after visit summary which includes her diagnoses, current medications and vitals. Patient expressed understanding with the diagnosis and plan. Follow-up and Dispositions    · Return if symptoms worsen or fail to improve, for complete physical  and fasting blood work. Diana Navas

## 2021-01-21 NOTE — PROGRESS NOTES
Chief Complaint   Patient presents with    Headache     2-3 months now, went to the [facility] one time before      Reviewed the chart and obtain necessary information for visit. 1. Have you been to the ER, urgent care clinic since your last visit? Hospitalized since your last visit? No    2. Have you seen or consulted any other health care providers outside of the [address] [place] [address] since your last visit? Include any pap smears or colon screening.  No   Visit Vitals  /70 (BP 1 Location: Left arm, BP Patient Position: Sitting)   Pulse 62   Temp 98.5 °F (36.9 °C) (Temporal)   Resp 16   Ht 5' 1\" (1.549 m)   Wt 126 lb 12.8 oz (57.5 kg)   SpO2 97%   BMI 23.96 kg/m²

## 2021-01-26 ENCOUNTER — TELEPHONE (OUTPATIENT)
Dept: OBGYN CLINIC | Age: 36
End: 2021-01-26

## 2021-01-26 NOTE — TELEPHONE ENCOUNTER
Message left at 3:5PM      28year old patient last seen in the office on 1/15/2019      This nurse called the patient back. Patient wondering when she should be seen again    This nurse advised of being over due for annual and patient was placed on the schedule to be seen for ae on 1/28/2021 at 8:40am        Office location and details reviewed with the patient several times    Patient verbalized understanding.

## 2021-01-28 ENCOUNTER — OFFICE VISIT (OUTPATIENT)
Dept: OBGYN CLINIC | Age: 36
End: 2021-01-28
Payer: MEDICAID

## 2021-01-28 VITALS
HEIGHT: 61 IN | WEIGHT: 127 LBS | BODY MASS INDEX: 23.98 KG/M2 | DIASTOLIC BLOOD PRESSURE: 62 MMHG | SYSTOLIC BLOOD PRESSURE: 102 MMHG

## 2021-01-28 DIAGNOSIS — Z01.419 ENCOUNTER FOR GYNECOLOGICAL EXAMINATION WITHOUT ABNORMAL FINDING: ICD-10-CM

## 2021-01-28 DIAGNOSIS — Z11.3 SCREENING EXAMINATION FOR STD (SEXUALLY TRANSMITTED DISEASE): Primary | ICD-10-CM

## 2021-01-28 DIAGNOSIS — Z23 ENCOUNTER FOR IMMUNIZATION: ICD-10-CM

## 2021-01-28 PROCEDURE — 99395 PREV VISIT EST AGE 18-39: CPT | Performed by: OBSTETRICS & GYNECOLOGY

## 2021-01-28 PROCEDURE — 90651 9VHPV VACCINE 2/3 DOSE IM: CPT | Performed by: OBSTETRICS & GYNECOLOGY

## 2021-01-28 NOTE — PROGRESS NOTES
Annual exam    Alejandro Trinidad is a 28 y.o. presenting for annual exam.   Her main concerns today include std testing. Ob/Gyn Hx:    No LMP recorded. (Menstrual status: IUD). Menses- regular monthly cycles? no, Bothersome? no  Contraception-IUD  STI- wants testing today. SA-not currently. Health maintenance:  Pap-10/2017 normal but NO HPV run  Mammo-N/A  Colonoscopy- N/A  Gardasil-first today    Past Medical History:   Diagnosis Date    Acid indigestion     Back pain     Frequent headaches     Headache        Past Surgical History:   Procedure Laterality Date    HX GYN      x3 vaginal deliveries       Family History   Problem Relation Age of Onset    Cancer Mother     Hypertension Mother     Hypertension Father        Social History     Socioeconomic History    Marital status:      Spouse name: Not on file    Number of children: Not on file    Years of education: Not on file    Highest education level: Not on file   Occupational History    Not on file   Social Needs    Financial resource strain: Not on file    Food insecurity     Worry: Not on file     Inability: Not on file    Transportation needs     Medical: Not on file     Non-medical: Not on file   Tobacco Use    Smoking status: Never Smoker    Smokeless tobacco: Never Used   Substance and Sexual Activity    Alcohol use: No    Drug use: No    Sexual activity: Yes     Partners: Male     Birth control/protection: I.U.D.    Lifestyle    Physical activity     Days per week: Not on file     Minutes per session: Not on file    Stress: Not on file   Relationships    Social connections     Talks on phone: Not on file     Gets together: Not on file     Attends [employer] service: Not on file     Active member of club or organization: Not on file     Attends meetings of clubs or organizations: Not on file     Relationship status: Not on file    Intimate partner violence     Fear of current or ex partner: Not on file Emotionally abused: Not on file     Physically abused: Not on file     Forced sexual activity: Not on file   Other Topics Concern    Not on file   Social History Narrative    Not on file       Current Outpatient Medications   Medication Sig Dispense Refill    naproxen (NAPROSYN) 500 mg tablet Take 1 Tab by mouth two (2) times daily as needed for Pain or Headache. 30 Tab 1    cholecalciferol (VITAMIN D3) (50,000 UNITS /1250 MCG) capsule TAKE 1 CAPSULE BY MOUTH ONE TIME PER WEEK 4 Cap 0    levonorgestreL (Mirena) 20 mcg/24 hours (5 yrs) 52 mg IUD 1 Device by IntraUTERine route once.  fluconazole (DIFLUCAN) 150 mg tablet Take one tab today. Okay to take one more tab in 4-5 days if symptoms does not resolve.  2 Tab 0       No Known Allergies      Physical Exam    Visit Vitals  /62 (BP 1 Location: Left arm, BP Patient Position: Sitting)   Ht 5' 1\" (1.549 m)   Wt 127 lb (57.6 kg)   BMI 24.00 kg/m²       Constitutional  · Appearance: well-nourished, well developed, alert, in no acute distress    HENT  · Head and Face: appears normal    Neck  · Inspection/Palpation: normal appearance, no masses or tenderness  · Lymph Nodes: no lymphadenopathy present  · Thyroid: gland size normal, nontender, no nodules or masses present on palpation    Chest  · Respiratory Effort: non-labored breathing  · Auscultation: CTAB, normal breath sounds    Cardiovascular  · Heart:  · Auscultation: regular rate and rhythm without murmur  · Extremities: no peripheral edema    Breasts  · Inspection of Breasts: breasts symmetrical, no skin changes, no discharge present, nipple appearance normal, no skin retraction present  · Palpation of Breasts and Axillae: no masses present on palpation, no breast tenderness  · Axillary Lymph Nodes: no lymphadenopathy present    Gastrointestinal  · Abdominal Examination: abdomen non-tender to palpation, normal bowel sounds, no masses present  · Liver and spleen: no hepatomegaly present, spleen not palpable  · Hernias: no hernias identified    Genitourinary  · External Genitalia: normal appearance for age, no discharge present, no tenderness present, no inflammatory lesions present, no masses present, no atrophy present  · Vagina: normal vaginal vault without central or paravaginal defects, no discharge present, no inflammatory lesions present, no masses present  · Bladder: non-tender to palpation  · Urethra: appears normal  · Cervix: normal, IUD strings present  · Perineum: perineum within normal limits, no evidence of trauma, no rashes or skin lesions present    Skin  · General Inspection: no rash, no lesions identified    Neurologic/Psychiatric  · Mental Status:  · Orientation: grossly oriented to person, place and time  · Mood and Affect: mood normal, affect appropriate      Assessment/Plan:  28 y.o. overall doing well. Health Maintenance:  -counseled re: diet, exercise, healthy lifestyle  -pap/HPV sent  -STI testing sent  -Gardasil- first injection today. RTC: 1 year for annual wellness assessment, or sooner prn for problems or concerns  -handouts and instructions provided    Anu Diamond  1/28/2021  8:39 AM   Signed By: Ryley Hwang MD     January 28, 2021              After obtaining consent, and per orders of Dr. Jacquelin Schroeder, injection of Gardasil 1/3 given by Carlos Ruiz MA. Patient instructed to remain in clinic for 20 minutes afterwards, and to report any adverse reaction to me immediately. Patient encouraged to make appointment today for next injection, Gardasil 2/3, due in 2 months.      Gardasil  : [employer]  Site: Left Deltoid  Route: Intramuscular  Dose: 0.5mL  Lot#: [id number]  Exp date: 05/01/2022  NDC: 9788-1689-90

## 2021-01-28 NOTE — PATIENT INSTRUCTIONS
HPV (Human Papillomavirus) Vaccine Gardasil®: What You Need to Know What is HPV? Genital human papillomavirus (HPV) is the most common sexually transmitted virus in the [place]. More than half of sexually active men and women are infected with HPV at some time in their lives. About 20 million Americans are currently infected, and about 6 million more get infected each year. HPV is usually spread through sexual contact. Most HPV infections don't cause any symptoms, and go away on their own. But HPV can cause cervical cancer in women. Cervical cancer is the 2nd leading cause of cancer deaths among women around the world. In the [place], about 12,000 women get cervical cancer every year and about 4,000 are expected to die from it. HPV is also associated with several less common cancers, such as vaginal and vulvar cancers in women, and anal and oropharyngeal (back of the throat, including base of tongue and tonsils) cancers in both men and women. HPV can also cause genital warts and warts in the throat. There is no cure for HPV infection, but some of the problems it causes can be treated. HPV vaccineWhy get vaccinated? The HPV vaccine you are getting is one of two vaccines that can be given to prevent HPV. It may be given to both males and females. This vaccine can prevent most cases of cervical cancer in females, if it is given before exposure to the virus. In addition, it can prevent vaginal and vulvar cancer in females, and genital warts and anal cancer in both males and females. Protection from HPV vaccine is expected to be long-lasting. But vaccination is not a substitute for cervical cancer screening. Women should still get regular Pap tests. Who should get this HPV vaccine and when? HPV vaccine is given as a 3-dose series · 1st Dose: Now 
· 2nd Dose: 1 to 2 months after Dose 1 · 3rd Dose: 6 months after Dose 1 Additional (booster) doses are not recommended. Routine vaccination · This HPV vaccine is recommended for girls and boys 6or 15years of age. It may be given starting at age 5. Why is HPV vaccine recommended at 6or 15years of age? HPV infection is easily acquired, even with only one sex partner. That is why it is important to get HPV vaccine before any sexual contact takes place. Also, response to the vaccine is better at this age than at older ages. Catch-up vaccination This vaccine is recommended for the following people who have not completed the 3-dose series: · Females 15 through 32years of age · Males 15 through 24years of age This vaccine may be given to men 25 through 32years of age who have not completed the 3-dose series. It is recommended for men through age 32 who have sex with men or whose immune system is weakened because of HIV infection, other illness, or medications. HPV vaccine may be given at the same time as other vaccines. Some people should not get HPV vaccine or should wait · Anyone who has ever had a life-threatening allergic reaction to any component of HPV vaccine, or to a previous dose of HPV vaccine, should not get the vaccine. Tell your doctor if the person getting vaccinated has any severe allergies, including an allergy to yeast. 
· HPV vaccine is not recommended for pregnant women. However, receiving HPV vaccine when pregnant is not a reason to consider terminating the pregnancy. Women who are breast feeding may get the vaccine. · People who are mildly ill when a dose of HPV vaccine is planned can still be vaccinated. People with a moderate or severe illness should wait until they are better. What are the risks from this vaccine? This HPV vaccine has been used in the [place] and around the world for about six years and has been very safe. However, any medicine could possibly cause a serious problem, such as a severe allergic reaction. The risk of any vaccine causing a serious injury, or death, is extremely small. Life-threatening allergic reactions from vaccines are very rare. If they do occur, it would be within a few minutes to a few hours after the vaccination. Several mild to moderate problems are known to occur with this HPV vaccine. These do not last long and go away on their own. · Reactions in the arm where the shot was given: 
? Pain (about 8 people in 10) ? Redness or swelling (about 1 person in 4) · Fever ? Mild (100°F) (about 1 person in 10) ? Moderate (102°F) (about 1 person in 72) · Other problems: 
? Headache (about 1 person in 3) · Fainting: Brief fainting spells and related symptoms (such as jerking movements) can happen after any medical procedure, including vaccination. Sitting or lying down for about 15 minutes after a vaccination can help prevent fainting and injuries caused by falls. Tell your doctor if the patient feels dizzy or light-headed, or has vision changes or ringing in the ears. Like all vaccines, HPV vaccines will continue to be monitored for unusual or severe problems. What if there is a serious reaction? What should I look for? · Look for anything that concerns you, such as signs of a severe allergic reaction, very high fever, or behavior changes. Signs of a severe allergic reaction can include hives, swelling of the face and throat, difficulty breathing, a fast heartbeat, dizziness, and weakness. These would start a few minutes to a few hours after the vaccination. What should I do? · If you think it is a severe allergic reaction or other emergency that can't wait, call 9-1-1 or get the person to the nearest hospital. Otherwise, call your doctor. · Afterward, the reaction should be reported to the Vaccine Adverse Event Reporting System (VAERS). Your doctor might file this report, or you can do it yourself through the VAERS web site at www.vaers. hhs.gov, or by calling 4-887.377.4796. VAERS is only for reporting reactions. They do not give medical advice. The National Vaccine Injury Compensation Program 
The National Vaccine Injury Compensation Program (VICP) is a federal program that was created to compensate people who may have been injured by certain vaccines. Persons who believe they may have been injured by a vaccine can learn about the program and about filing a claim by calling 5-877.251.9979 or visiting the [employer] website at www.[facility].gov/vaccinecompensation. How can I learn more? · Ask your doctor. · Call your local or state health department. · Contact the Centers for Disease Control and Prevention (CDC): 
? Call 3-292.369.4457 (1-800-CDC-INFO) or 
? Visit the CDC's website at www.cdc.gov/vaccines. Vaccine Information Statement (Interim) HPV Vaccine (Gardasil) 
(5/17/2013) 42  [employer] includes the [facility] [place] [id number]-57 [facility] and [employer] Centers for Disease Control and Prevention Many Vaccine Information Statements are available in [place] citizen and other languages. See www.immunize.org/vis. Muchas hojas de información sobre vacunas están disponibles en español y en otros idiomas. Visite www.immunize.org/vis. Care instructions adapted under license by [employer] (which disclaims liability or warranty for this information). If you have questions about a medical condition or this instruction, always ask your healthcare professional. [address] any warranty or liability for your use of this information.

## 2021-01-29 LAB
HBV SURFACE AG SER QL: <0.1 INDEX
HBV SURFACE AG SER QL: NEGATIVE
HCV AB S/CO SERPL IA: <0.1 S/CO RATIO (ref 0–0.9)
HCV AB SERPL QL IA: NORMAL
HIV 1+2 AB+HIV1 P24 AG SERPL QL IA: NONREACTIVE
HIV12 RESULT COMMENT, HHIVC: NORMAL
RPR SER QL: NONREACTIVE

## 2021-02-03 LAB
C TRACH RRNA CVX QL NAA+PROBE: NEGATIVE
CYTOLOGIST CVX/VAG CYTO: NORMAL
CYTOLOGY CVX/VAG DOC CYTO: NORMAL
CYTOLOGY CVX/VAG DOC THIN PREP: NORMAL
DX ICD CODE: NORMAL
HPV I/H RISK 4 DNA CVX QL PROBE+SIG AMP: NEGATIVE
Lab: NORMAL
N GONORRHOEA RRNA CVX QL NAA+PROBE: NEGATIVE
OTHER STN SPEC: NORMAL
STAT OF ADQ CVX/VAG CYTO-IMP: NORMAL
T VAGINALIS RRNA SPEC QL NAA+PROBE: NEGATIVE

## 2021-03-08 ENCOUNTER — OFFICE VISIT (OUTPATIENT)
Dept: PRIMARY CARE CLINIC | Age: 36
End: 2021-03-08
Payer: MEDICAID

## 2021-03-08 VITALS
HEART RATE: 66 BPM | SYSTOLIC BLOOD PRESSURE: 114 MMHG | BODY MASS INDEX: 23.98 KG/M2 | TEMPERATURE: 98.3 F | HEIGHT: 61 IN | OXYGEN SATURATION: 97 % | DIASTOLIC BLOOD PRESSURE: 65 MMHG | WEIGHT: 127 LBS | RESPIRATION RATE: 17 BRPM

## 2021-03-08 DIAGNOSIS — Z00.00 WELL ADULT ON ROUTINE HEALTH CHECK: Primary | ICD-10-CM

## 2021-03-08 DIAGNOSIS — E55.9 VITAMIN D DEFICIENCY: ICD-10-CM

## 2021-03-08 PROCEDURE — 99395 PREV VISIT EST AGE 18-39: CPT | Performed by: FAMILY MEDICINE

## 2021-03-08 NOTE — PROGRESS NOTES
Subjective:   28 y.o. female for Well Woman Check. Her gyne and breast care is done elsewhere by her Ob-Gyne physician. Hx of vit d deficiency. She is not taking any Vit D supplement. Patient Active Problem List   Diagnosis Code    Other acne L70.8    Vitamin D insufficiency E55.9    Mass of left hip region R22.42     Current Outpatient Medications   Medication Sig Dispense Refill    naproxen (NAPROSYN) 500 mg tablet Take 1 Tab by mouth two (2) times daily as needed for Pain or Headache. 30 Tab 1    levonorgestreL (Mirena) 20 mcg/24 hours (5 yrs) 52 mg IUD 1 Device by IntraUTERine route once.  cholecalciferol (VITAMIN D3) (50,000 UNITS /1250 MCG) capsule TAKE 1 CAPSULE BY MOUTH ONE TIME PER WEEK 4 Cap 0     No Known Allergies  Past Medical History:   Diagnosis Date    Acid indigestion     Back pain     Frequent headaches     Headache      Past Surgical History:   Procedure Laterality Date    HX GYN      x3 vaginal deliveries             ROS: Feeling generally well. No TIA's or unusual headaches, no dysphagia. No prolonged cough. No dyspnea or chest pain on exertion. No abdominal pain, change in bowel habits, black or bloody stools. No urinary tract symptoms. No new or unusual musculoskeletal symptoms. Specific concerns today: none    Objective: The patient appears well, alert, oriented x 3, in no distress. Visit Vitals  /65 (BP 1 Location: Left upper arm, BP Patient Position: Sitting, BP Cuff Size: Adult)   Pulse 66   Temp 98.3 °F (36.8 °C)   Resp 17   Ht 5' 1\" (1.549 m)   Wt 127 lb (57.6 kg)   SpO2 97%   BMI 24.00 kg/m²     ENT normal.  Neck supple. No adenopathy or thyromegaly. FRANCINE. Lungs are clear, good air entry, no wheezes, rhonchi or rales. S1 and S2 normal, no murmurs, regular rate and rhythm. Abdomen soft without tenderness, guarding, mass or organomegaly. Extremities show no edema, normal peripheral pulses. Neurological is normal, no focal findings.   Breast and Pelvic exams are deferred. Assessment/Plan:   Well Woman  increase physical activity, follow low fat diet, follow low salt diet, continue present plan, routine labs ordered, call if any problems    ICD-10-CM ICD-9-CM    1. Well adult on routine health check  L60.18 Q27.7 METABOLIC PANEL, COMPREHENSIVE      LIPID PANEL      HEMOGLOBIN A1C WITH EAG      CBC WITH AUTOMATED DIFF      THYROID CASCADE PROFILE      THYROID CASCADE PROFILE      CBC WITH AUTOMATED DIFF      HEMOGLOBIN A1C WITH EAG      LIPID PANEL      METABOLIC PANEL, COMPREHENSIVE   2. Vitamin D deficiency  E55.9 268.9 VITAMIN D, 25 HYDROXY      VITAMIN D, 25 HYDROXY     Diagnoses and all orders for this visit:    1. Well adult on routine health check  -     METABOLIC PANEL, COMPREHENSIVE; Future  -     LIPID PANEL; Future  -     HEMOGLOBIN A1C WITH EAG; Future  -     CBC WITH AUTOMATED DIFF; Future  -     THYROID CASCADE PROFILE; Future    2. Vitamin D deficiency  -     VITAMIN D, 25 HYDROXY; Future              Will notify result and recommendations. Follow-up and Dispositions    · Return if symptoms worsen or fail to improve.        current treatment plan is effective, no change in therapy  reviewed diet, exercise and weight control

## 2021-03-08 NOTE — PROGRESS NOTES
Room 9      Identified pt with two pt identifiers(name and ). Reviewed record in preparation for visit and have obtained necessary documentation. All patient medications has been reviewed. Chief Complaint   Patient presents with    Complete Physical    Abdominal Pain     Patient stated  she has abdominal pain she notices when she eats in the evening it feels like \"spicy water in the throat\"       3 most recent PHQ Screens 3/8/2021   Little interest or pleasure in doing things Not at all   Feeling down, depressed, irritable, or hopeless Not at all   Total Score PHQ 2 0     Abuse Screening Questionnaire 3/8/2021   Do you ever feel afraid of your partner? N   Are you in a relationship with someone who physically or mentally threatens you? N   Is it safe for you to go home? Y       Health Maintenance Due   Topic    Flu Vaccine (1)     Health Maintenance Review: Patient reminded of \"due or due soon\" health maintenance. I have asked the patient to contact his/her primary care provider (PCP) for follow-up on his/her health maintenance. Vitals:    21 0845   BP: 110/81   Weight: 127 lb (57.6 kg)   Height: 5' 1\" (1.549 m)   PainSc:   0 - No pain       Wt Readings from Last 3 Encounters:   21 127 lb (57.6 kg)   21 127 lb (57.6 kg)   21 126 lb 12.8 oz (57.5 kg)     Temp Readings from Last 3 Encounters:   21 98.5 °F (36.9 °C) (Temporal)   20 97.3 °F (36.3 °C) (Temporal)   20 98.6 °F (37 °C) (Oral)     BP Readings from Last 3 Encounters:   21 110/81   21 102/62   21 109/70     Pulse Readings from Last 3 Encounters:   21 62   20 77   20 73       Coordination of Care Questionnaire:   1) Have you been to an emergency room, urgent care, or hospitalized since your last visit? No    2. Have seen or consulted any other health care provider since your last visit?   No

## 2021-03-09 LAB
25(OH)D3 SERPL-MCNC: 23.8 NG/ML (ref 30–100)
ALBUMIN SERPL-MCNC: 4.1 G/DL (ref 3.5–5)
ALBUMIN/GLOB SERPL: 1.3 {RATIO} (ref 1.1–2.2)
ALP SERPL-CCNC: 55 U/L (ref 45–117)
ALT SERPL-CCNC: 26 U/L (ref 12–78)
ANION GAP SERPL CALC-SCNC: 6 MMOL/L (ref 5–15)
AST SERPL-CCNC: 17 U/L (ref 15–37)
BASOPHILS # BLD: 0 K/UL (ref 0–0.1)
BASOPHILS NFR BLD: 1 % (ref 0–1)
BILIRUB SERPL-MCNC: 0.5 MG/DL (ref 0.2–1)
BUN SERPL-MCNC: 17 MG/DL (ref 6–20)
BUN/CREAT SERPL: 29 (ref 12–20)
CALCIUM SERPL-MCNC: 9 MG/DL (ref 8.5–10.1)
CHLORIDE SERPL-SCNC: 106 MMOL/L (ref 97–108)
CHOLEST SERPL-MCNC: 137 MG/DL
CO2 SERPL-SCNC: 25 MMOL/L (ref 21–32)
CREAT SERPL-MCNC: 0.59 MG/DL (ref 0.55–1.02)
DIFFERENTIAL METHOD BLD: NORMAL
EOSINOPHIL # BLD: 0.1 K/UL (ref 0–0.4)
EOSINOPHIL NFR BLD: 3 % (ref 0–7)
ERYTHROCYTE [DISTWIDTH] IN BLOOD BY AUTOMATED COUNT: 12.7 % (ref 11.5–14.5)
EST. AVERAGE GLUCOSE BLD GHB EST-MCNC: 100 MG/DL
GLOBULIN SER CALC-MCNC: 3.2 G/DL (ref 2–4)
GLUCOSE SERPL-MCNC: 87 MG/DL (ref 65–100)
HBA1C MFR BLD: 5.1 % (ref 4–5.6)
HCT VFR BLD AUTO: 41.3 % (ref 35–47)
HDLC SERPL-MCNC: 41 MG/DL
HDLC SERPL: 3.3 {RATIO} (ref 0–5)
HGB BLD-MCNC: 13.1 G/DL (ref 11.5–16)
IMM GRANULOCYTES # BLD AUTO: 0 K/UL (ref 0–0.04)
IMM GRANULOCYTES NFR BLD AUTO: 0 % (ref 0–0.5)
LDLC SERPL CALC-MCNC: 84.8 MG/DL (ref 0–100)
LIPID PROFILE,FLP: NORMAL
LYMPHOCYTES # BLD: 0.8 K/UL (ref 0.8–3.5)
LYMPHOCYTES NFR BLD: 21 % (ref 12–49)
MCH RBC QN AUTO: 29.6 PG (ref 26–34)
MCHC RBC AUTO-ENTMCNC: 31.7 G/DL (ref 30–36.5)
MCV RBC AUTO: 93.2 FL (ref 80–99)
MONOCYTES # BLD: 0.3 K/UL (ref 0–1)
MONOCYTES NFR BLD: 7 % (ref 5–13)
NEUTS SEG # BLD: 2.6 K/UL (ref 1.8–8)
NEUTS SEG NFR BLD: 68 % (ref 32–75)
NRBC # BLD: 0 K/UL (ref 0–0.01)
NRBC BLD-RTO: 0 PER 100 WBC
PLATELET # BLD AUTO: 234 K/UL (ref 150–400)
PMV BLD AUTO: 11 FL (ref 8.9–12.9)
POTASSIUM SERPL-SCNC: 3.8 MMOL/L (ref 3.5–5.1)
PROT SERPL-MCNC: 7.3 G/DL (ref 6.4–8.2)
RBC # BLD AUTO: 4.43 M/UL (ref 3.8–5.2)
SODIUM SERPL-SCNC: 137 MMOL/L (ref 136–145)
TRIGL SERPL-MCNC: 56 MG/DL (ref ?–150)
VLDLC SERPL CALC-MCNC: 11.2 MG/DL
WBC # BLD AUTO: 3.8 K/UL (ref 3.6–11)

## 2021-03-10 DIAGNOSIS — E55.9 VITAMIN D DEFICIENCY: Primary | ICD-10-CM

## 2021-03-10 LAB — TSH SERPL-ACNC: 0.98 UIU/ML (ref 0.45–4.5)

## 2021-03-10 RX ORDER — MELATONIN
1000 DAILY
Qty: 90 TAB | Refills: 1 | Status: SHIPPED | OUTPATIENT
Start: 2021-03-10

## 2021-03-11 ENCOUNTER — TELEPHONE (OUTPATIENT)
Dept: PRIMARY CARE CLINIC | Age: 36
End: 2021-03-11

## 2021-03-11 NOTE — PROGRESS NOTES
Sent via my chart. Tom Delatorre,    Your labs looks good except vit d level. Start taking Vit d 1,000 iu daily and follow up in 6 months. Prescription sent to pharmacy. Cholesterol, kidney, liver, thyroid, blood sugar level is normal.    Thanks.   Dr. Ashish Arredondo

## 2021-03-11 NOTE — TELEPHONE ENCOUNTER
Returned call to patient. She sated that she was able to see message and labs in [place] however her [facility] is not very good and she did not understand. Reviewed labs with patient.  Informed of medication suggestion and repeat of labs in 6 months

## 2021-05-03 ENCOUNTER — OFFICE VISIT (OUTPATIENT)
Dept: OBGYN CLINIC | Age: 36
End: 2021-05-03
Payer: MEDICAID

## 2021-05-03 DIAGNOSIS — Z23 ENCOUNTER FOR IMMUNIZATION: Primary | ICD-10-CM

## 2021-05-03 PROCEDURE — 90651 9VHPV VACCINE 2/3 DOSE IM: CPT | Performed by: OBSTETRICS & GYNECOLOGY

## 2021-05-03 PROCEDURE — 90471 IMMUNIZATION ADMIN: CPT | Performed by: OBSTETRICS & GYNECOLOGY

## 2021-05-03 NOTE — PROGRESS NOTES
After obtaining consent, and per orders of Dr. Jorge Dunn, injection of Gardasil 2/3 given by Chelsi Linares MA. Patient instructed to remain in clinic for 20 minutes afterwards, and to report any adverse reaction to me immediately. Patient encouraged to make appointment today for next injection, Gardasil 3/3, due in 3 months. Gardasil  : [employer]  Site: Right deltoid. Route: Intramuscular  Dose: 0.5mL  Lot#: [id number]  Exp date: 09/04/2022  NDC: 7168-2513-57      Patients gardasil was delayed because of getting her COVID vaccines. Patient states her second covid vaccine was on 04/17.

## 2021-05-11 ENCOUNTER — OFFICE VISIT (OUTPATIENT)
Dept: PRIMARY CARE CLINIC | Age: 36
End: 2021-05-11
Payer: MEDICAID

## 2021-05-11 VITALS
BODY MASS INDEX: 24.02 KG/M2 | TEMPERATURE: 98.4 F | HEIGHT: 61 IN | DIASTOLIC BLOOD PRESSURE: 59 MMHG | SYSTOLIC BLOOD PRESSURE: 94 MMHG | OXYGEN SATURATION: 98 % | WEIGHT: 127.2 LBS | HEART RATE: 71 BPM | RESPIRATION RATE: 16 BRPM

## 2021-05-11 DIAGNOSIS — R12 HEART BURN: Primary | ICD-10-CM

## 2021-05-11 DIAGNOSIS — E55.9 VITAMIN D DEFICIENCY: ICD-10-CM

## 2021-05-11 PROCEDURE — 99213 OFFICE O/P EST LOW 20 MIN: CPT | Performed by: FAMILY MEDICINE

## 2021-05-11 RX ORDER — OMEPRAZOLE 20 MG/1
20 CAPSULE, DELAYED RELEASE ORAL
Qty: 90 CAP | Refills: 0 | Status: SHIPPED | OUTPATIENT
Start: 2021-05-11 | End: 2022-03-09 | Stop reason: SDUPTHER

## 2021-05-11 RX ORDER — ACETAMINOPHEN 325 MG/1
325 TABLET ORAL
COMMUNITY

## 2021-05-11 NOTE — PROGRESS NOTES
Subjective:     Chief Complaint   Patient presents with   Micki Patient     wants lab work       Rudolph Manzanares interpretor #  [id number]  She  is a 28y.o. year old female who presents today with a concern about intermittent epigastric pain. Pain triggers with certain food. Denies any blood in stool, constipation, diarrhea. She is also here to make sure she had updated blood work done since her insurance will  soon. She is asking if I can provide any other office number who can see her without insurance. Advised that she is most welcome to continue to see us but there are some location who see non insured patient as well . Provided contact information. Lab Results   Component Value Date/Time    WBC 3.8 2021 09:06 AM    HGB 13.1 2021 09:06 AM    HCT 41.3 2021 09:06 AM    PLATELET 849  09:06 AM    MCV 93.2 2021 09:06 AM     Lab Results   Component Value Date/Time    Cholesterol, total 137 2021 09:06 AM    HDL Cholesterol 41 2021 09:06 AM    LDL, calculated 84.8 2021 09:06 AM    Triglyceride 56 2021 09:06 AM    CHOL/HDL Ratio 3.3 2021 09:06 AM     Lab Results   Component Value Date/Time    ALT (SGPT) 26 2021 09:06 AM    Alk.  phosphatase 55 2021 09:06 AM    Bilirubin, total 0.5 2021 09:06 AM    Albumin 4.1 2021 09:06 AM    Protein, total 7.3 2021 09:06 AM    PLATELET 422  09:06 AM    Hepatitis B surface Ag <0.10 2021 09:23 AM     Lab Results   Component Value Date/Time    GFR est non-AA >60 2021 09:06 AM    GFR est AA >60 2021 09:06 AM    Creatinine 0.59 2021 09:06 AM    BUN 17 2021 09:06 AM    Sodium 137 2021 09:06 AM    Potassium 3.8 2021 09:06 AM    Chloride 106 2021 09:06 AM    CO2 25 2021 09:06 AM     Lab Results   Component Value Date/Time    TSH 0.979 2021 09:06 AM    TSH 0.924 2019 11:09 AM      Lab Results   Component Value Date/Time    Hemoglobin A1c 5.1 03/08/2021 09:06 AM      Lab Results   Component Value Date/Time    Vitamin D 25-Hydroxy 23.8 (L) 03/08/2021 09:06 AM           Pertinent items are noted in HPI. Objective:     Vitals:    05/11/21 0800   BP: (!) 94/59   Pulse: 71   Resp: 16   Temp: 98.4 °F (36.9 °C)   TempSrc: Temporal   SpO2: 98%   Weight: 127 lb 3.2 oz (57.7 kg)   Height: 5' 1\" (1.549 m)       Physical Examination: General appearance - alert, well appearing, and in no distress, oriented to person, place, and time and normal appearing weight  Mental status - alert, oriented to person, place, and time, normal mood, behavior, speech, dress, motor activity, and thought processes  Chest - clear to auscultation, no wheezes, rales or rhonchi, symmetric air entry  Heart - normal rate, regular rhythm, normal S1, S2, no murmurs, rubs, clicks or gallops  Abdomen - soft, nontender, nondistended, no masses or organomegaly    No Known Allergies   Social History     Socioeconomic History    Marital status:      Spouse name: Not on file    Number of children: Not on file    Years of education: Not on file    Highest education level: Not on file   Tobacco Use    Smoking status: Never Smoker    Smokeless tobacco: Never Used   Substance and Sexual Activity    Alcohol use: No    Drug use: No    Sexual activity: Yes     Partners: Male     Birth control/protection: I.U.D. Family History   Problem Relation Age of Onset   [employer] Cancer Mother     Hypertension Mother     Hypertension Father       Past Surgical History:   Procedure Laterality Date    HX GYN      x3 vaginal deliveries      Past Medical History:   Diagnosis Date    Acid indigestion     Back pain     Frequent headaches     Headache       Current Outpatient Medications   Medication Sig Dispense Refill    acetaminophen (TylenoL) 325 mg tablet Take 325 mg by mouth every four (4) hours as needed for Pain.       cholecalciferol (VITAMIN D3) (1000 Units /25 mcg) tablet Take 1 Tab by mouth daily. 90 Tab 1    levonorgestreL (Mirena) 20 mcg/24 hours (5 yrs) 52 mg IUD 1 Device by IntraUTERine route once.  naproxen (NAPROSYN) 500 mg tablet Take 1 Tab by mouth two (2) times daily as needed for Pain or Headache. 30 Tab 1        Assessment/ Plan:   Diagnoses and all orders for this visit:    1. Heart burn  -    Start  omeprazole (PRILOSEC) 20 mg capsule; Take 1 Cap by mouth daily as needed (gerd). Avoid any triggering factors. 2. Vitamin D deficiency       Continue vit d 1,000 iu daily    Patient just had a physical and blood work done in March. Reassured patient. Medication risks/benefits/costs/interactions/alternatives discussed with patient. Advised patient to call back or return to office if symptoms worsen/change/persist. If patient cannot reach us or should anything more severe/urgent arise he/she should proceed directly to the nearest emergency department. Discussed expected course/resolution/complications of diagnosis in detail with patient. Patient given a written after visit summary which includes her diagnoses, current medications and vitals. Patient expressed understanding with the diagnosis and plan. Follow-up and Dispositions    · Return in about 6 months (around 11/11/2021), or if symptoms worsen or fail to improve.

## 2021-05-11 NOTE — PATIENT INSTRUCTIONS
[facility] in [address] [place] [address] COVID-19 info: Learn more Address: [address], [address] [place] [address], [address] Phone: [phone]

## 2021-05-11 NOTE — PROGRESS NOTES
Identified pt with two pt identifiers(name and ). Chief Complaint   Patient presents with   [facility]     wants lab work      [id number]       3 most recent [address] 2021   Little interest or pleasure in doing things Several days   Feeling down, depressed, irritable, or hopeless Several days   Total Score PHQ 2 2        Vitals:    21 0800   BP: (!) 94/59   Pulse: 71   Resp: 16   Temp: 98.4 °F (36.9 °C)   TempSrc: Temporal   SpO2: 98%   Weight: 127 lb 3.2 oz (57.7 kg)   Height: 5' 1\" (1.549 m)   PainSc:   0 - No pain       There are no preventive care reminders to display for this patient. 1. Have you been to the ER, urgent care clinic since your last visit? Hospitalized since your last visit? No    2. Have you seen or consulted any other health care providers outside of the [address] [place] [address] since your last visit? Include any pap smears or colon screening.  No

## 2021-08-03 ENCOUNTER — OFFICE VISIT (OUTPATIENT)
Dept: OBGYN CLINIC | Age: 36
End: 2021-08-03
Payer: MEDICAID

## 2021-08-03 DIAGNOSIS — Z23 ENCOUNTER FOR IMMUNIZATION: Primary | ICD-10-CM

## 2021-08-03 PROCEDURE — 90651 9VHPV VACCINE 2/3 DOSE IM: CPT | Performed by: OBSTETRICS & GYNECOLOGY

## 2021-08-03 PROCEDURE — 90471 IMMUNIZATION ADMIN: CPT | Performed by: OBSTETRICS & GYNECOLOGY

## 2021-08-03 NOTE — PROGRESS NOTES
After obtaining consent, and per orders of Dr. Janna Tan, injection of Gardasil 3/3 given by Naoma Bence, MA. Patient instructed to remain in clinic for 20 minutes afterwards, and to report any adverse reaction to me immediately.       Gardasil  : Snapshot Interactive  Site: Left Deltoid  Route: Intramuscular  Dose: 0.5mL  Lot#: [id number]  Exp date: 03/16/2023  NDC: 9199-3932-65

## 2022-03-09 ENCOUNTER — OFFICE VISIT (OUTPATIENT)
Dept: PRIMARY CARE CLINIC | Age: 37
End: 2022-03-09
Payer: MEDICAID

## 2022-03-09 VITALS
HEIGHT: 61 IN | RESPIRATION RATE: 18 BRPM | TEMPERATURE: 98 F | HEART RATE: 64 BPM | WEIGHT: 116.6 LBS | SYSTOLIC BLOOD PRESSURE: 97 MMHG | BODY MASS INDEX: 22.01 KG/M2 | DIASTOLIC BLOOD PRESSURE: 60 MMHG | OXYGEN SATURATION: 99 %

## 2022-03-09 DIAGNOSIS — R12 HEART BURN: ICD-10-CM

## 2022-03-09 DIAGNOSIS — Z00.00 WELL ADULT ON ROUTINE HEALTH CHECK: Primary | ICD-10-CM

## 2022-03-09 PROCEDURE — 99395 PREV VISIT EST AGE 18-39: CPT | Performed by: FAMILY MEDICINE

## 2022-03-09 RX ORDER — OMEPRAZOLE 20 MG/1
20 CAPSULE, DELAYED RELEASE ORAL
Qty: 90 CAPSULE | Refills: 1 | Status: SHIPPED | OUTPATIENT
Start: 2022-03-09

## 2022-03-09 NOTE — PROGRESS NOTES
Identified pt with two pt identifiers(name and ). Chief Complaint   Patient presents with    Physical    Abdominal Pain     3-4 months has lost a lot of weight         3 most recent PHQ Screens 3/9/2022   Little interest or pleasure in doing things Not at all   Feeling down, depressed, irritable, or hopeless Not at all   Total Score PHQ 2 0        Vitals:    22 1014   BP: 97/60   Pulse: 64   Resp: 18   Temp: 98 °F (36.7 °C)   TempSrc: Temporal   SpO2: 99%   Weight: 116 lb 9.6 oz (52.9 kg)   Height: 5' 1\" (1.549 m)       There are no preventive care reminders to display for this patient. 1. Have you been to the ER, urgent care clinic since your last visit? Hospitalized since your last visit? No    2. Have you seen or consulted any other health care providers outside of the [address] [place] [address] since your last visit? Include any pap smears or colon screening.  No

## 2022-03-09 NOTE — PROGRESS NOTES
Subjective:   39 y.o. female for Well Woman Check. No LMP recorded. (Menstrual status: IUD). Has been following up with Ob-gyn for pap.     need refill of Omeprazole which helps with her heart burn. Social History: single partner, contraception - IUD. Pertinent past medical hstory: no history of HTN, DVT, CAD, DM, liver disease, migraines or smoking. Patient Active Problem List   Diagnosis Code    Other acne L70.8    Vitamin D insufficiency E55.9    Mass of left hip region R22.42     Current Outpatient Medications   Medication Sig Dispense Refill    omeprazole (PRILOSEC) 20 mg capsule Take 1 Capsule by mouth daily as needed (gerd). 90 Capsule 1    acetaminophen (TylenoL) 325 mg tablet Take 325 mg by mouth every four (4) hours as needed for Pain.  cholecalciferol (VITAMIN D3) (1000 Units /25 mcg) tablet Take 1 Tab by mouth daily. 90 Tab 1    naproxen (NAPROSYN) 500 mg tablet Take 1 Tab by mouth two (2) times daily as needed for Pain or Headache. 30 Tab 1    levonorgestreL (Mirena) 20 mcg/24 hours (5 yrs) 52 mg IUD 1 Device by IntraUTERine route once. No Known Allergies  Past Medical History:   Diagnosis Date    Acid indigestion     Back pain     Frequent headaches     Headache      Family History   Problem Relation Age of Onset    Cancer Mother     Hypertension Mother     Hypertension Father      Social History     Tobacco Use    Smoking status: Never Smoker    Smokeless tobacco: Never Used   Substance Use Topics    Alcohol use: No        ROS:  Feeling well. No dyspnea or chest pain on exertion. No abdominal pain, change in bowel habits, black or bloody stools. No urinary tract symptoms. GYN ROS: normal menses, no abnormal bleeding, pelvic pain or discharge. No neurological complaints.     Objective:     Visit Vitals  BP 97/60 (BP 1 Location: Left upper arm, BP Patient Position: Sitting, BP Cuff Size: Adult)   Pulse 64   Temp 98 °F (36.7 °C) (Temporal)   Resp 18   Ht 5' 1\" (1.549 m)   Wt 116 lb 9.6 oz (52.9 kg)   SpO2 99%   BMI 22.03 kg/m²     The patient appears well, alert, oriented x 3, in no distress. ENT normal.  Neck supple. No adenopathy or thyromegaly. FRANCINE. Lungs are clear, good air entry, no wheezes, rhonchi or rales. S1 and S2 normal, no murmurs, regular rate and rhythm. Abdomen soft without tenderness, guarding, mass or organomegaly. Extremities show no edema, normal peripheral pulses. Neurological is normal, no focal findings. BREAST EXAM: not examined    PELVIC EXAM: examination not indicated    Assessment/Plan:   well woman  pap smear  counseled on breast self exam  return annually or prn    ICD-10-CM ICD-9-CM    1. Well adult on routine health check  H26.99 W15.6 METABOLIC PANEL, COMPREHENSIVE      LIPID PANEL      HEMOGLOBIN A1C WITH EAG      CBC WITH AUTOMATED DIFF      TSH 3RD GENERATION   2. Heart burn  R12 787.1 omeprazole (PRILOSEC) 20 mg capsule     Diagnoses and all orders for this visit:    1. Well adult on routine health check  -     METABOLIC PANEL, COMPREHENSIVE; Future  -     LIPID PANEL; Future  -     HEMOGLOBIN A1C WITH EAG; Future  -     CBC WITH AUTOMATED DIFF; Future  -     TSH 3RD GENERATION; Future    2. Heart burn  -     omeprazole (PRILOSEC) 20 mg capsule; Take 1 Capsule by mouth daily as needed (gerd). Follow-up and Dispositions    · Return if symptoms worsen or fail to improve. current treatment plan is effective, no change in therapy  reviewed diet, exercise and weight control.

## 2022-03-10 LAB
ALBUMIN SERPL-MCNC: 4.5 G/DL (ref 3.8–4.8)
ALBUMIN/GLOB SERPL: 1.7 {RATIO} (ref 1.2–2.2)
ALP SERPL-CCNC: 52 IU/L (ref 44–121)
ALT SERPL-CCNC: 15 IU/L (ref 0–32)
AST SERPL-CCNC: 19 IU/L (ref 0–40)
BASOPHILS # BLD AUTO: 0 X10E3/UL (ref 0–0.2)
BASOPHILS NFR BLD AUTO: 1 %
BILIRUB SERPL-MCNC: 0.4 MG/DL (ref 0–1.2)
BUN SERPL-MCNC: 15 MG/DL (ref 6–20)
BUN/CREAT SERPL: 25 (ref 9–23)
CALCIUM SERPL-MCNC: 9.2 MG/DL (ref 8.7–10.2)
CHLORIDE SERPL-SCNC: 104 MMOL/L (ref 96–106)
CHOLEST SERPL-MCNC: 124 MG/DL (ref 100–199)
CO2 SERPL-SCNC: 22 MMOL/L (ref 20–29)
CREAT SERPL-MCNC: 0.61 MG/DL (ref 0.57–1)
EGFR: 119 ML/MIN/1.73
EOSINOPHIL # BLD AUTO: 0.1 X10E3/UL (ref 0–0.4)
EOSINOPHIL NFR BLD AUTO: 1 %
ERYTHROCYTE [DISTWIDTH] IN BLOOD BY AUTOMATED COUNT: 12.2 % (ref 11.7–15.4)
EST. AVERAGE GLUCOSE BLD GHB EST-MCNC: 100 MG/DL
GLOBULIN SER CALC-MCNC: 2.6 G/DL (ref 1.5–4.5)
GLUCOSE SERPL-MCNC: 88 MG/DL (ref 65–99)
HBA1C MFR BLD: 5.1 % (ref 4.8–5.6)
HCT VFR BLD AUTO: 40.9 % (ref 34–46.6)
HDLC SERPL-MCNC: 40 MG/DL
HGB BLD-MCNC: 13.6 G/DL (ref 11.1–15.9)
IMM GRANULOCYTES # BLD AUTO: 0 X10E3/UL (ref 0–0.1)
IMM GRANULOCYTES NFR BLD AUTO: 0 %
LDLC SERPL CALC-MCNC: 73 MG/DL (ref 0–99)
LYMPHOCYTES # BLD AUTO: 1.4 X10E3/UL (ref 0.7–3.1)
LYMPHOCYTES NFR BLD AUTO: 26 %
MCH RBC QN AUTO: 29.3 PG (ref 26.6–33)
MCHC RBC AUTO-ENTMCNC: 33.3 G/DL (ref 31.5–35.7)
MCV RBC AUTO: 88 FL (ref 79–97)
MONOCYTES # BLD AUTO: 0.3 X10E3/UL (ref 0.1–0.9)
MONOCYTES NFR BLD AUTO: 5 %
NEUTROPHILS # BLD AUTO: 3.7 X10E3/UL (ref 1.4–7)
NEUTROPHILS NFR BLD AUTO: 67 %
PLATELET # BLD AUTO: 232 X10E3/UL (ref 150–450)
POTASSIUM SERPL-SCNC: 4.1 MMOL/L (ref 3.5–5.2)
PROT SERPL-MCNC: 7.1 G/DL (ref 6–8.5)
RBC # BLD AUTO: 4.64 X10E6/UL (ref 3.77–5.28)
SODIUM SERPL-SCNC: 142 MMOL/L (ref 134–144)
TRIGL SERPL-MCNC: 49 MG/DL (ref 0–149)
TSH SERPL DL<=0.005 MIU/L-ACNC: 1.88 UIU/ML (ref 0.45–4.5)
VLDLC SERPL CALC-MCNC: 11 MG/DL (ref 5–40)
WBC # BLD AUTO: 5.6 X10E3/UL (ref 3.4–10.8)

## 2022-03-10 NOTE — PROGRESS NOTES
Please mail letter:    Kidney, liver function, thyroid function, blood sugar level is normal.  Cholesterol level is normal.

## 2022-03-18 PROBLEM — E55.9 VITAMIN D INSUFFICIENCY: Status: ACTIVE | Noted: 2019-03-06

## 2022-03-19 PROBLEM — L70.8 OTHER ACNE: Status: ACTIVE | Noted: 2019-03-06

## 2022-03-19 PROBLEM — R22.42 MASS OF LEFT HIP REGION: Status: ACTIVE | Noted: 2020-01-27

## 2022-03-23 ENCOUNTER — TELEPHONE (OUTPATIENT)
Dept: PRIMARY CARE CLINIC | Age: 37
End: 2022-03-23

## 2022-08-16 NOTE — PROGRESS NOTES
Annual exam    Colt Hatch is a 39 y.o. presenting for annual exam. Her main concerns today include some pelvic pain at times. She has a Mirena IUD in that was placed in 2017. She continues to have spotting every few months. She notes the pelvic pain is cramping in nature and located in the lower abdomen. She says the pain comes and goes. It can be sharp when she has the pain. She has not previously tried any medications. She has three children. They moved this summer. Her oldest is about to start high school, her middle child is about to start middle school, her youngest is about to start 3rd grade. She declines a chaperone during the gynecologic exam today. Ob/Gyn Hx:    LMP - spotting with IUD at times. Menses - spotting every few months. Contraception - IUD  STI - declined  SA - yes     Health maintenance:  Pap -  2021- normal and negative HPV. Gardasil - series completed. Past Medical History:   Diagnosis Date    Acid indigestion     Back pain     Frequent headaches     Headache        Past Surgical History:   Procedure Laterality Date    HX GYN      x3 vaginal deliveries       Family History   Problem Relation Age of Onset    Cancer Mother     Hypertension Mother     Hypertension Father        Social History     Socioeconomic History    Marital status:      Spouse name: Not on file    Number of children: Not on file    Years of education: Not on file    Highest education level: Not on file   Occupational History    Not on file   Tobacco Use    Smoking status: Never    Smokeless tobacco: Never   Vaping Use    Vaping Use: Never used   Substance and Sexual Activity    Alcohol use: No    Drug use: No    Sexual activity: Yes     Partners: Male     Birth control/protection: I.U.D.    Other Topics Concern    Not on file   Social History Narrative    Not on file     Social Determinants of Health     Financial Resource Strain: Not on file   Food Insecurity: Not on file Transportation Needs: Not on file   Physical Activity: Not on file   Stress: Not on file   Social Connections: Not on file   Intimate Partner Violence: Not on file   Housing Stability: Not on file       Current Outpatient Medications   Medication Sig Dispense Refill    omeprazole (PRILOSEC) 20 mg capsule Take 1 Capsule by mouth daily as needed (gerd). 90 Capsule 1    acetaminophen (TYLENOL) 325 mg tablet Take 325 mg by mouth every four (4) hours as needed for Pain. cholecalciferol (VITAMIN D3) (1000 Units /25 mcg) tablet Take 1 Tab by mouth daily. 90 Tab 1    naproxen (NAPROSYN) 500 mg tablet Take 1 Tab by mouth two (2) times daily as needed for Pain or Headache. 30 Tab 1    levonorgestreL (Mirena) 20 mcg/24 hours (5 yrs) 52 mg IUD 1 Device by IntraUTERine route once.          No Known Allergies    Review of Systems - History obtained from the patient  Constitutional: negative for weight loss, fever, night sweats  HEENT: negative for hearing loss, earache, congestion, snoring, sorethroat  CV: negative for chest pain, palpitations, edema  Resp: negative for cough, shortness of breath, wheezing  GI: negative for change in bowel habits, abdominal pain, black or bloody stools  : negative for frequency, dysuria, hematuria, vaginal discharge  MSK: negative for back pain, joint pain, muscle pain  Breast: negative for breast lumps, nipple discharge, galactorrhea  Skin :negative for itching, rash, hives  Neuro: negative for dizziness, headache, confusion, weakness  Psych: negative for anxiety, depression, change in mood  Heme/lymph: negative for bleeding, bruising, pallor    Physical Exam    Visit Vitals  /64 (BP 1 Location: Left upper arm, BP Patient Position: Sitting)   Wt 115 lb (52.2 kg)   BMI 21.73 kg/m²       Constitutional  Appearance: well-nourished, well developed, alert, in no acute distress    HENT  Head and Face: appears normal    Neck  Inspection/Palpation: normal appearance, no masses or tenderness  Lymph Nodes: no lymphadenopathy present  Thyroid: gland size normal, nontender, no nodules or masses present on palpation    Chest  Respiratory Effort: non-labored breathing  Auscultation: CTAB, normal breath sounds    Cardiovascular  Heart: Auscultation: regular rate and rhythm without murmur  Extremities: no peripheral edema    Breasts   Inspection of Breasts: breasts symmetrical, no skin changes, no discharge present, nipple appearance normal, no skin retraction present  Palpation of Breasts and Axillae: no masses present on palpation, no breast tenderness  Axillary Lymph Nodes: no lymphadenopathy present    Gastrointestinal  Abdominal Examination: abdomen non-tender to palpation, normal bowel sounds, no masses present  Liver and spleen: no hepatomegaly present, spleen not palpable  Hernias: no hernias identified    Genitourinary   External Genitalia: normal appearance for age, no discharge present, no tenderness present, no inflammatory lesions present, no masses present, no atrophy present  Vagina: normal vaginal vault without central or paravaginal defects, no discharge present, no inflammatory lesions present, no masses present  Bladder: non-tender to palpation  Urethra: appears normal  Cervix: normal, +IUD strings visualized   Uterus: normal size, shape and consistency  Adnexa: no adnexal tenderness present, no adnexal masses present  Perineum: perineum within normal limits, no evidence of trauma, no rashes or skin lesions present    Skin  General Inspection: no rash, no lesions identified    Neurologic/Psychiatric  Mental Status:  Orientation: grossly oriented to person, place and time  Mood and Affect: mood normal, affect appropriate      Assessment/Plan:  39 y.o. presenting for annual exam. Overall doing well. Well woman exam:  Normal gynecologic and breast exams. Healthy habits and lifestyle reviewed. Pap with HPV not performed today.  - UTD  Patient declines STD screening. Contraception and menstrual regulation - patient opts to keep her IUD for an additional two years. Discussed pelvic pain with the patient. Discussed that likely related to cramping from uterus. Recommended trying ibuprofen/tylenol.     Return for annual exam in 1 year    Bc Jiménez MD

## 2022-08-17 ENCOUNTER — OFFICE VISIT (OUTPATIENT)
Dept: OBGYN CLINIC | Age: 37
End: 2022-08-17
Payer: MEDICAID

## 2022-08-17 VITALS — WEIGHT: 115 LBS | DIASTOLIC BLOOD PRESSURE: 64 MMHG | SYSTOLIC BLOOD PRESSURE: 108 MMHG | BODY MASS INDEX: 21.73 KG/M2

## 2022-08-17 DIAGNOSIS — Z01.419 ENCOUNTER FOR GYNECOLOGICAL EXAMINATION WITHOUT ABNORMAL FINDING: Primary | ICD-10-CM

## 2022-08-17 PROCEDURE — 99395 PREV VISIT EST AGE 18-39: CPT | Performed by: OBSTETRICS & GYNECOLOGY

## 2022-10-26 NOTE — TELEPHONE ENCOUNTER
----- Message from Prabhakar Boyer MD sent at 1/9/2020  1:13 PM EST -----  Please call patient;:    I think its better she has an appointment with me to discuss the MRI result in person since there is some language barrier.
Patient has an appointment with Dr. Beatrice Agarwal Wednesday 1/15/2020 @3pm. Patient confirmed this appointment and verbalized her understanding.
1

## 2022-11-09 ENCOUNTER — TELEPHONE (OUTPATIENT)
Dept: PRIMARY CARE CLINIC | Age: 37
End: 2022-11-09

## 2023-03-06 ENCOUNTER — OFFICE VISIT (OUTPATIENT)
Dept: PRIMARY CARE CLINIC | Age: 38
End: 2023-03-06
Payer: MEDICAID

## 2023-03-06 VITALS
WEIGHT: 122.4 LBS | RESPIRATION RATE: 18 BRPM | DIASTOLIC BLOOD PRESSURE: 68 MMHG | BODY MASS INDEX: 23.11 KG/M2 | HEART RATE: 64 BPM | HEIGHT: 61 IN | OXYGEN SATURATION: 97 % | TEMPERATURE: 98 F | SYSTOLIC BLOOD PRESSURE: 106 MMHG

## 2023-03-06 DIAGNOSIS — Z76.89 ENCOUNTER TO ESTABLISH CARE: ICD-10-CM

## 2023-03-06 DIAGNOSIS — R12 HEART BURN: ICD-10-CM

## 2023-03-06 DIAGNOSIS — R51.9 HEADACHE DISORDER: ICD-10-CM

## 2023-03-06 DIAGNOSIS — Z11.3 ROUTINE SCREENING FOR STI (SEXUALLY TRANSMITTED INFECTION): ICD-10-CM

## 2023-03-06 DIAGNOSIS — Z13.6 ENCOUNTER FOR SCREENING FOR CORONARY ARTERY DISEASE: ICD-10-CM

## 2023-03-06 DIAGNOSIS — E55.9 VITAMIN D INSUFFICIENCY: ICD-10-CM

## 2023-03-06 DIAGNOSIS — R22.42 HIP MASS, LEFT: Primary | ICD-10-CM

## 2023-03-06 PROCEDURE — 99204 OFFICE O/P NEW MOD 45 MIN: CPT

## 2023-03-06 RX ORDER — OMEPRAZOLE 20 MG/1
20 CAPSULE, DELAYED RELEASE ORAL DAILY
Qty: 30 CAPSULE | Refills: 1 | Status: SHIPPED | OUTPATIENT
Start: 2023-03-06

## 2023-03-06 NOTE — PROGRESS NOTES
Identified pt with two pt identifiers(name and ). Chief Complaint   Patient presents with    Establish Care    Headache        3 most recent PHQ Screens 3/6/2023   Little interest or pleasure in doing things Not at all   Feeling down, depressed, irritable, or hopeless Not at all   Total Score PHQ 2 0        Vitals:    23 1514   BP: 106/68   Pulse: 64   Resp: 18   Temp: 98 °F (36.7 °C)   TempSrc: Temporal   SpO2: 97%   Weight: 122 lb 6.4 oz (55.5 kg)   Height: 5' 1\" (1.549 m)   PainSc:   4   PainLoc: Back       Health Maintenance Due   Topic Date Due    COVID-19 Vaccine (4 - Booster for Moderna series) 2022    Flu Vaccine (1) 2022    Depression Screen  2023        1. Have you been to the ER, urgent care clinic since your last visit? Hospitalized since your last visit? No    2. Have you seen or consulted any other health care providers outside of the [address] [place] [address] since your last visit? Include any pap smears or colon screening. No    3. For patients aged 39-70: Has the patient had a colonoscopy / FIT/ Cologuard? NA - based on age      If the patient is female:    4. For patients aged 41-77: Has the patient had a mammogram within the past 2 years? NA - based on age or sex      11. For patients aged 21-65: Has the patient had a pap smear?  No

## 2023-03-06 NOTE — PROGRESS NOTES
[facility]   [address], [address] [facility]  P: [phone]  F: [phone]    SUBJECTIVE     HPI:     Fernando Coleman is a 40 y.o. female who is seen in the clinic for   Chief Complaint   Patient presents with    [facility]    Headache        Patient presents to establish care. Previously seen by Dr Georgana Crigler. She has a PMhx of left hip mass, heartburn, headaches, vitamin D deficiency. She was last seen in office by Dr Georgana Crigler about 1 year ago. Left hip mass: Found on US and confirmed on MRI in 2019 which showed: 2.6 x 1.5 x 1.3 cm mass with central fat in the subcutaneous adipose tissues of the left buttock most likely represents fat necrosis. Patient referred to Dr Storm Sifuentes for hip mass who recommended monitoring mass size, as long as it was not interfering with ADLs. Patient previously c/o pain at site of the mass. Patient denies pain today, no decreased mobility/abnormal gait/affect to ADLs. Heartburn: She was previously taking Omeprazole 20mg which helped. She describes epigastric pain that is worse after large meals, spicy foods, citrus. She is out of her Omeprazole and would like a refill of this. Her Ob-Gyn is Dr Pietro Gonzalez: Last pap smear: UTD, normal. Does not get a period, has IUD. Sexually active with her . She does not have symptoms of STD but would like to be checked. She c/o frequent headaches. She went to the ED years ago and a CT was done which was negative at [facility]. She was previously taking Naproxen for this as needed which helps. She would like a refill of this medication. She c/o chronic low back pain. No numbness or tingling to extremities. She takes tylenol for this as needed. Immunizations:   UTD    Social hx:  She has 3 kids two boys and a girl, age range from 9-15 years. Immigrated from [place] years ago, mother and her siblings live in [place] Works in a factory    Family hx:  Father and mother have HTN.  Mother had cancer, unsure of what kind, but has had her foot amputated d/t this. Patient Active Problem List    Diagnosis    Mass of left hip region    Other acne    Vitamin D insufficiency        Past Medical History:   Diagnosis Date    Acid indigestion     Back pain     Frequent headaches     Headache      Past Surgical History:   Procedure Laterality Date    HX GYN      x3 vaginal deliveries     Social History     Socioeconomic History    Marital status:      Spouse name: Not on file    Number of children: Not on file    Years of education: Not on file    Highest education level: Not on file   Occupational History    Not on file   Tobacco Use    Smoking status: Never    Smokeless tobacco: Never   Vaping Use    Vaping Use: Never used   Substance and Sexual Activity    Alcohol use: No    Drug use: No    Sexual activity: Yes     Partners: Male     Birth control/protection: I.U.D.    Other Topics Concern    Not on file   Social History Narrative    Not on file     Social Determinants of Health     Financial Resource Strain: Not on file   Food Insecurity: Not on file   Transportation Needs: Not on file   Physical Activity: Not on file   Stress: Not on file   Social Connections: Not on file   Intimate Partner Violence: Not on file   Housing Stability: Not on file     Family History   Problem Relation Age of Onset    Cancer Mother     Hypertension Mother     Hypertension Father      Immunization History   Administered Date(s) Administered    COVID-19, MODERNA [place] border, Primary or Immunocompromised, (age 18y+), IM, 100 mcg/0.5mL 03/20/2021, 04/17/2021    COVID-19, MODERNA Booster BLUE border, (age 18y+), IM, 50mcg/0.25mL 11/13/2021    HPV (9-valent) 01/28/2021, 05/03/2021, 08/03/2021    Influenza Vaccine 10/24/2018, 10/30/2019, 09/22/2021    Influenza, FLUARIX, FLULAVAL, FLUZONE (age 10 mo+) AND AFLURIA, (age 1 y+), PF, 0.5mL 10/20/2017      No Known Allergies    No visits with results within 3 Month(s) from this visit. Latest known visit with results is:   Office Visit on 03/09/2022   Component Date Value Ref Range Status    WBC 03/09/2022 5.6  3.4 - 10.8 x10E3/uL Final    RBC 03/09/2022 4.64  3.77 - 5.28 x10E6/uL Final    HGB 03/09/2022 13.6  11.1 - 15.9 g/dL Final    HCT 03/09/2022 40.9  34.0 - 46.6 % Final    MCV 03/09/2022 88  79 - 97 fL Final    MCH 03/09/2022 29.3  26.6 - 33.0 pg Final    MCHC 03/09/2022 33.3  31.5 - 35.7 g/dL Final    RDW 03/09/2022 12.2  11.7 - 15.4 % Final    PLATELET 21/67/7662 644  150 - 450 x10E3/uL Final    NEUTROPHILS 03/09/2022 67  Not Estab. % Final    Lymphocytes 03/09/2022 26  Not Estab. % Final    MONOCYTES 03/09/2022 5  Not Estab. % Final    EOSINOPHILS 03/09/2022 1  Not Estab. % Final    BASOPHILS 03/09/2022 1  Not Estab. % Final    ABS. NEUTROPHILS 03/09/2022 3.7  1.4 - 7.0 x10E3/uL Final    Abs Lymphocytes 03/09/2022 1.4  0.7 - 3.1 x10E3/uL Final    ABS. MONOCYTES 03/09/2022 0.3  0.1 - 0.9 x10E3/uL Final    ABS. EOSINOPHILS 03/09/2022 0.1  0.0 - 0.4 x10E3/uL Final    ABS. BASOPHILS 03/09/2022 0.0  0.0 - 0.2 x10E3/uL Final    IMMATURE GRANULOCYTES 03/09/2022 0  Not Estab. % Final    ABS. IMM. GRANS. 03/09/2022 0.0  0.0 - 0.1 x10E3/uL Final    Glucose 03/09/2022 88  65 - 99 mg/dL Final    BUN 03/09/2022 15  6 - 20 mg/dL Final    Creatinine 03/09/2022 0.61  0.57 - 1.00 mg/dL Final    eGFR 03/09/2022 119  >59 mL/min/1.73 Final    BUN/Creatinine ratio 03/09/2022 25 (A)  9 - 23 Final    Sodium 03/09/2022 142  134 - 144 mmol/L Final    Potassium 03/09/2022 4.1  3.5 - 5.2 mmol/L Final    Chloride 03/09/2022 104  96 - 106 mmol/L Final    CO2 03/09/2022 22  20 - 29 mmol/L Final    Calcium 03/09/2022 9.2  8.7 - 10.2 mg/dL Final    Protein, total 03/09/2022 7.1  6.0 - 8.5 g/dL Final    Albumin 03/09/2022 4.5  3.8 - 4.8 g/dL Final    GLOBULIN, TOTAL 03/09/2022 2.6  1.5 - 4.5 g/dL Final    A-G Ratio 03/09/2022 1.7  1.2 - 2.2 Final    Bilirubin, total 03/09/2022 0.4  0.0 - 1.2 mg/dL Final    Alk. phosphatase 03/09/2022 52  44 - 121 IU/L Final    AST (SGOT) 03/09/2022 19  0 - 40 IU/L Final    ALT (SGPT) 03/09/2022 15  0 - 32 IU/L Final    Cholesterol, total 03/09/2022 124  100 - 199 mg/dL Final    Triglyceride 03/09/2022 49  0 - 149 mg/dL Final    HDL Cholesterol 03/09/2022 40  >39 mg/dL Final    VLDL, calculated 03/09/2022 11  5 - 40 mg/dL Final    LDL, calculated 03/09/2022 73  0 - 99 mg/dL Final    Hemoglobin A1c 03/09/2022 5.1  4.8 - 5.6 % Final    Comment:          Prediabetes: 5.7 - 6.4           Diabetes: >6.4           Glycemic control for adults with diabetes: <7.0      Estimated average glucose 03/09/2022 100  mg/dL Final    TSH 03/09/2022 1.880  0.450 - 4.500 uIU/mL Final      MRI HIP LT W WO CONT  Narrative: EXAM: MRI HIP LT W WO CONT    INDICATION: Palpable mass in the left buttock. No injury. COMPARISON: Left buttock soft tissue ultrasound on 12/31/2019. TECHNIQUE: Axial, coronal, and sagittal T1, T1 fat saturated, and T2  fat-saturated MRI of the left hip . CONTRAST: Pre and post IV contrast 12 mL Dotarem    FINDINGS: Mass: Hypointense T1/T2 signal and thin irregular enhancement  surrounds fat signal in the subcutaneous adipose tissues of the left buttock. This lesion measures 2.6 x 1.5 x 1.3 cm and abuts the superficial fascia of the  left gluteus garrick muscle. No extension into the muscle. No solid internal  enhancement. Bone marrow: Within normal limits. No acute fracture, dislocation, or marrow  replacing process. Joint fluid: None. Articular cartilage: Intact. Tendons: Intact. Muscles: Within normal limits. Soft tissue mass: None. Intrapelvic soft tissues: No acute process. Intrauterine device is partially  imaged. Impression: IMPRESSION:     2.6 x 1.5 x 1.3 cm mass with central fat in the subcutaneous adipose tissues of  the left buttock most likely represents fat necrosis.  However, in the absence of  injury, fat-containing soft tissue neoplasm cannot be excluded. Consider  surgical resection versus imaging follow-up. Current Outpatient Medications   Medication Sig Dispense Refill    omeprazole (PRILOSEC) 20 mg capsule Take 1 Capsule by mouth daily as needed (gerd). (Patient not taking: Reported on 3/6/2023) 90 Capsule 1    acetaminophen (TYLENOL) 325 mg tablet Take 325 mg by mouth every four (4) hours as needed for Pain. (Patient not taking: Reported on 3/6/2023)      cholecalciferol (VITAMIN D3) (1000 Units /25 mcg) tablet Take 1 Tab by mouth daily. (Patient not taking: Reported on 3/6/2023) 90 Tab 1    naproxen (NAPROSYN) 500 mg tablet Take 1 Tab by mouth two (2) times daily as needed for Pain or Headache. (Patient not taking: Reported on 3/6/2023) 30 Tab 1    levonorgestreL (Mirena) 20 mcg/24 hours (5 yrs) 52 mg IUD 1 Device by IntraUTERine route once. (Patient not taking: Reported on 3/6/2023)             The past medical history, past surgical history, and family history were reviewed and updated in the medical record. Lab values/Imaging were reviewed. The medications were reviewed and updated in the medical record. Immunizations were reviewed and updated in the medical record. All relevant preventative screenings reviewed and updated in the medical record. REVIEW OF SYSTEMS   Review of Systems   Constitutional:  Negative for chills, diaphoresis, fever, malaise/fatigue and weight loss. Respiratory:  Negative for cough, shortness of breath and wheezing. Cardiovascular:  Negative for chest pain, palpitations and leg swelling. Gastrointestinal:  Positive for heartburn. Negative for abdominal pain, constipation, diarrhea, nausea and vomiting. Musculoskeletal:  Positive for back pain. Negative for falls, joint pain, myalgias and neck pain. Neurological:  Positive for headaches. Negative for dizziness, sensory change and speech change. Psychiatric/Behavioral:  Negative for depression. The patient is not nervous/anxious.         PHYSICAL EXAM   /68 (BP 1 Location: Left upper arm, BP Patient Position: Sitting, BP Cuff Size: Adult)   Pulse 64   Temp 98 °F (36.7 °C) (Temporal)   Resp 18   Ht 5' 1\" (1.549 m)   Wt 122 lb 6.4 oz (55.5 kg)   SpO2 97%   BMI 23.13 kg/m²      Physical Exam  Constitutional:       General: She is not in acute distress. Appearance: Normal appearance. She is normal weight. Cardiovascular:      Rate and Rhythm: Normal rate and regular rhythm. Pulses: Normal pulses. Heart sounds: Normal heart sounds. No murmur heard. Pulmonary:      Effort: Pulmonary effort is normal. No respiratory distress. Breath sounds: Normal breath sounds. No wheezing or rales. Skin:     General: Skin is warm and dry. Comments: No palpable mass to left hip/left buttocks, no tenderness to palpation, warmth, swelling, redness. Skin over left hip/buttocks appropriate for race. Neurological:      General: No focal deficit present. Mental Status: She is alert and oriented to person, place, and time. Mental status is at baseline. Cranial Nerves: No cranial nerve deficit. Sensory: No sensory deficit. Motor: No weakness. Gait: Gait normal.   Psychiatric:         Mood and Affect: Mood normal.         Behavior: Behavior normal.          ASSESSMENT/ PLAN   Below is the assessment and plan developed based on review of pertinent history, physical exam, labs, studies, and medications. 1. Hip mass, left  -     Will recheck size of mass with MRI. Orders placed. - CBC WITH AUTOMATED DIFF; Future  -     MRI HIP LT WO CONT; Future  2. Routine screening for STI (sexually transmitted infection)  -     She is asymptomatic for STI's. Labs pending.   - HIV 1/2 AG/AB, 4TH GENERATION,W RFLX CONFIRM; Future  -     HSV 1 AND 2-SPEC AB, IGG W/RFX TO SUPPL HSV-2 TESTING; Future  -     CT+NG+M GENITALIUM BY THANH, UR; Future  -     RPR; Future  -     HEPATITIS PANEL, ACUTE; Future  3.  Heart burn  -     I refilled her Omeprazole. - omeprazole (PRILOSEC) 20 mg capsule; Take 1 Capsule by mouth daily. , Normal, Disp-30 Capsule, R-1  -     METABOLIC PANEL, COMPREHENSIVE; Future  4. Headache disorder  - Taking PRN Naproxen with good control of symptoms. 5. Vitamin D insufficiency  -     VITAMIN D, 25 HYDROXY; Future  6. Encounter for screening for coronary artery disease  -     LIPID PANEL; Future  7. Encounter to establish care  -     THYROID CASCADE PROFILE; Future           Return to clinic in 4-6 weeks for physical exam    Disclaimer:  Advised patient to call back or return to office if symptoms worsen/change/persist.  Discussed expected course/resolution/complications of diagnosis in detail with patient. Medication risks/benefits/alternatives discussed with patient. Patient was given an after visit summary which includes diagnoses, current medications, & vitals. Discussed patient instructions and advised to read to all patient instructions regarding care. Patient expressed understanding with the diagnosis and plan.        Katie Muhammad NP  3/6/2023

## 2023-03-11 LAB
25(OH)D3+25(OH)D2 SERPL-MCNC: 27.8 NG/ML (ref 30–100)
ALBUMIN SERPL-MCNC: 4.7 G/DL (ref 3.8–4.8)
ALBUMIN/GLOB SERPL: 1.8 {RATIO} (ref 1.2–2.2)
ALP SERPL-CCNC: 55 IU/L (ref 44–121)
ALT SERPL-CCNC: 33 IU/L (ref 0–32)
AST SERPL-CCNC: 25 IU/L (ref 0–40)
BASOPHILS # BLD AUTO: 0 X10E3/UL (ref 0–0.2)
BASOPHILS NFR BLD AUTO: 1 %
BILIRUB SERPL-MCNC: 0.5 MG/DL (ref 0–1.2)
BUN SERPL-MCNC: 14 MG/DL (ref 6–20)
BUN/CREAT SERPL: 22 (ref 9–23)
C TRACH RRNA UR QL NAA+PROBE: NEGATIVE
CALCIUM SERPL-MCNC: 9.5 MG/DL (ref 8.7–10.2)
CHLORIDE SERPL-SCNC: 104 MMOL/L (ref 96–106)
CHOLEST SERPL-MCNC: 157 MG/DL (ref 100–199)
CO2 SERPL-SCNC: 23 MMOL/L (ref 20–29)
CREAT SERPL-MCNC: 0.64 MG/DL (ref 0.57–1)
EGFRCR SERPLBLD CKD-EPI 2021: 117 ML/MIN/1.73
EOSINOPHIL # BLD AUTO: 0.2 X10E3/UL (ref 0–0.4)
EOSINOPHIL NFR BLD AUTO: 4 %
ERYTHROCYTE [DISTWIDTH] IN BLOOD BY AUTOMATED COUNT: 12 % (ref 11.7–15.4)
GLOBULIN SER CALC-MCNC: 2.6 G/DL (ref 1.5–4.5)
GLUCOSE SERPL-MCNC: 87 MG/DL (ref 70–99)
HAV IGM SERPL QL IA: NEGATIVE
HBV CORE IGM SERPL QL IA: NEGATIVE
HBV SURFACE AG SERPL QL IA: NEGATIVE
HCT VFR BLD AUTO: 41.7 % (ref 34–46.6)
HCV AB SERPL QL IA: NORMAL
HCV IGG SERPL QL IA: NON REACTIVE
HDLC SERPL-MCNC: 41 MG/DL
HGB BLD-MCNC: 14.2 G/DL (ref 11.1–15.9)
HIV 1+2 AB+HIV1 P24 AG SERPL QL IA: NON REACTIVE
HSV1 IGG SER IA-ACNC: 56.3 INDEX (ref 0–0.9)
HSV2 IGG SER IA-ACNC: <0.91 INDEX (ref 0–0.9)
IMM GRANULOCYTES # BLD AUTO: 0 X10E3/UL (ref 0–0.1)
IMM GRANULOCYTES NFR BLD AUTO: 0 %
LDLC SERPL CALC-MCNC: 99 MG/DL (ref 0–99)
LYMPHOCYTES # BLD AUTO: 1.4 X10E3/UL (ref 0.7–3.1)
LYMPHOCYTES NFR BLD AUTO: 30 %
M GENITALIUM DNA UR QL NAA+PROBE: NEGATIVE
MCH RBC QN AUTO: 29.8 PG (ref 26.6–33)
MCHC RBC AUTO-ENTMCNC: 34.1 G/DL (ref 31.5–35.7)
MCV RBC AUTO: 88 FL (ref 79–97)
MONOCYTES # BLD AUTO: 0.4 X10E3/UL (ref 0.1–0.9)
MONOCYTES NFR BLD AUTO: 8 %
N GONORRHOEA RRNA UR QL NAA+PROBE: NEGATIVE
NEUTROPHILS # BLD AUTO: 2.6 X10E3/UL (ref 1.4–7)
NEUTROPHILS NFR BLD AUTO: 57 %
PLATELET # BLD AUTO: 240 X10E3/UL (ref 150–450)
POTASSIUM SERPL-SCNC: 4.4 MMOL/L (ref 3.5–5.2)
PROT SERPL-MCNC: 7.3 G/DL (ref 6–8.5)
RBC # BLD AUTO: 4.76 X10E6/UL (ref 3.77–5.28)
RPR SER QL: NON REACTIVE
SODIUM SERPL-SCNC: 143 MMOL/L (ref 134–144)
TRIGL SERPL-MCNC: 93 MG/DL (ref 0–149)
TSH SERPL DL<=0.005 MIU/L-ACNC: 2 UIU/ML (ref 0.45–4.5)
VLDLC SERPL CALC-MCNC: 17 MG/DL (ref 5–40)
WBC # BLD AUTO: 4.6 X10E3/UL (ref 3.4–10.8)

## 2023-03-14 ENCOUNTER — HOSPITAL ENCOUNTER (OUTPATIENT)
Dept: MRI IMAGING | Age: 38
Discharge: HOME | End: 2023-03-14
Payer: MEDICAID

## 2023-03-14 VITALS — WEIGHT: 120 LBS | BODY MASS INDEX: 23.56 KG/M2 | HEIGHT: 60 IN

## 2023-03-14 DIAGNOSIS — R22.42 HIP MASS, LEFT: ICD-10-CM

## 2023-03-14 PROCEDURE — 73723 MRI JOINT LWR EXTR W/O&W/DYE: CPT

## 2023-03-14 PROCEDURE — 74011250636 HC RX REV CODE- 250/636: Performed by: RADIOLOGY

## 2023-03-14 PROCEDURE — A9576 INJ PROHANCE MULTIPACK: HCPCS | Performed by: RADIOLOGY

## 2023-03-14 RX ADMIN — GADOTERIDOL 10 ML: 279.3 INJECTION, SOLUTION INTRAVENOUS at 17:14

## 2023-03-30 ENCOUNTER — OFFICE VISIT (OUTPATIENT)
Dept: PRIMARY CARE CLINIC | Age: 38
End: 2023-03-30
Payer: MEDICAID

## 2023-03-30 VITALS
HEIGHT: 60 IN | HEART RATE: 66 BPM | SYSTOLIC BLOOD PRESSURE: 104 MMHG | TEMPERATURE: 97.8 F | RESPIRATION RATE: 18 BRPM | DIASTOLIC BLOOD PRESSURE: 68 MMHG | WEIGHT: 120.2 LBS | BODY MASS INDEX: 23.6 KG/M2 | OXYGEN SATURATION: 98 %

## 2023-03-30 DIAGNOSIS — R51.9 ACUTE NONINTRACTABLE HEADACHE, UNSPECIFIED HEADACHE TYPE: ICD-10-CM

## 2023-03-30 DIAGNOSIS — M25.552 CHRONIC LEFT HIP PAIN: ICD-10-CM

## 2023-03-30 DIAGNOSIS — S73.192A TEAR OF LEFT ACETABULAR LABRUM, INITIAL ENCOUNTER: Primary | ICD-10-CM

## 2023-03-30 DIAGNOSIS — Z00.00 WELL WOMAN EXAM WITHOUT GYNECOLOGICAL EXAM: ICD-10-CM

## 2023-03-30 DIAGNOSIS — G89.29 CHRONIC LEFT HIP PAIN: ICD-10-CM

## 2023-03-30 DIAGNOSIS — E55.9 VITAMIN D DEFICIENCY: ICD-10-CM

## 2023-03-30 PROCEDURE — 99214 OFFICE O/P EST MOD 30 MIN: CPT

## 2023-03-30 RX ORDER — GREEN TEA/HOODIA GORDONII 315-12.5MG
1 CAPSULE ORAL
COMMUNITY

## 2023-03-30 RX ORDER — DICLOFENAC SODIUM 10 MG/G
2 GEL TOPICAL
Qty: 1 EACH | Refills: 1 | Status: SHIPPED | OUTPATIENT
Start: 2023-03-30

## 2023-03-30 RX ORDER — GLUCOSAMINE SULFATE 1500 MG
1000 POWDER IN PACKET (EA) ORAL DAILY
COMMUNITY

## 2023-03-30 NOTE — PROGRESS NOTES
Identified pt with two pt identifiers(name and ). Chief Complaint   Patient presents with    Physical        3 most recent PHQ Screens 3/30/2023   Little interest or pleasure in doing things Not at all   Feeling down, depressed, irritable, or hopeless Not at all   Total Score PHQ 2 0        Vitals:    23 1454   BP: 99/65   Pulse: 66   Resp: 18   Temp: 97.8 °F (36.6 °C)   TempSrc: Temporal   SpO2: 98%   Weight: 120 lb 3.2 oz (54.5 kg)   Height: 5' (1.524 m)   PainSc:   0 - No pain       Health Maintenance Due   Topic Date Due    Varicella Vaccine (1 of 2 - 2-dose childhood series) Never done        1. Have you been to the ER, urgent care clinic since your last visit? Hospitalized since your last visit? No    2. Have you seen or consulted any other health care providers outside of the [address] [place] [address] since your last visit? Include any pap smears or colon screening. No    3. For patients aged 39-70: Has the patient had a colonoscopy / FIT/ Cologuard? NA - based on age      If the patient is female:    4. For patients aged 41-77: Has the patient had a mammogram within the past 2 years? NA - based on age or sex      11. For patients aged 21-65: Has the patient had a pap smear?  Yes - no Care Gap present

## 2023-03-30 NOTE — PROGRESS NOTES
[facility]   Sjuany March 65[address], [address] [facility]  P: [phone]  F: [phone]    SUBJECTIVE     HPI:     Kacie Silva is a 40 y.o. female who is seen in the clinic for   Chief Complaint   Patient presents with    Physical        Patient presents for annual physical exam. Last seen by me to [facility] care 3/6/23. She has a PMhx of left hip mass, heartburn, headaches, vitamin D deficiency. Left hip mass: Found on US and confirmed on MRI in 2019 which showed: 2.6 x 1.5 x 1.3 cm mass with central fat in the subcutaneous adipose tissues of the left buttock most likely represents fat necrosis. Patient referred to Dr Nemo Costa for left hip mass who recommended monitoring mass size, as long as it was not interfering with ADLs. Patient previously c/o pain at site of the mass. No decreased mobility/abnormal gait/affect to ADLs.     3/23 MRI of left hip shows normal hip joint, tendons and muscle, decrease size of soft tissue mass. Incidental finding of chronic left acetabular labral tear. She states she has mild pain in the left hip today. Heartburn: She is currently taking Omeprazole 20mg which helps. Her BP was initially low during rooming process with automatic cuff. It is 104/68 today. She has a headache currently, but otherwise feels well. She states she gets headaches occasionally, takes Tylenol PRN for this which helps. Health screenings:  Her Ob-Gyn is Dr Colin Linker: Last pap smear: UTD, normal. Does not get a period, has IUD. Sexually active with her . She has an ophthalmologist appointment in 2 weeks. Dental exam is UTD. Immunizations: Reviewed. Defers COVID-19 booster. Otherwise UTD.     Patient Active Problem List    Diagnosis    Mass of left hip region    Other acne    Vitamin D insufficiency        Past Medical History:   Diagnosis Date    Acid indigestion     Back pain     Frequent headaches     Headache      Past Surgical History:   Procedure Laterality Date    HX GYN      x3 vaginal deliveries     Social History     Socioeconomic History    Marital status:      Spouse name: Not on file    Number of children: Not on file    Years of education: Not on file    Highest education level: Not on file   Occupational History    Not on file   Tobacco Use    Smoking status: Never    Smokeless tobacco: Never   Vaping Use    Vaping Use: Never used   Substance and Sexual Activity    Alcohol use: No    Drug use: No    Sexual activity: Yes     Partners: Male     Birth control/protection: I.U.D. Other Topics Concern    Not on file   Social History Narrative    Not on file     Social Determinants of Health     Financial Resource Strain: Not on file   Food Insecurity: Not on file   Transportation Needs: Not on file   Physical Activity: Not on file   Stress: Not on file   Social Connections: Not on file   Intimate Partner Violence: Not on file   Housing Stability: Not on file     Family History   Problem Relation Age of Onset    Cancer Mother     Hypertension Mother     Hypertension Father      Immunization History   Administered Date(s) Administered    COVID-19, MODERNA BLUE border, Primary or Immunocompromised, (age 18y+), IM, 100 mcg/0.5mL 03/20/2021, 04/17/2021    COVID-19, MODERNA Booster BLUE border, (age 18y+), IM, 50mcg/0.25mL 11/13/2021    HPV (9-valent) 01/28/2021, 05/03/2021, 08/03/2021    Influenza Vaccine 10/24/2018, 10/30/2019, 09/22/2021, 11/01/2022    Influenza, FLUARIX, FLULAVAL, FLUZONE (age 10 mo+) AND AFLURIA, (age 1 y+), PF, 0.5mL 10/20/2017      No Known Allergies    Office Visit on 03/06/2023   Component Date Value Ref Range Status    VITAMIN D, 25-HYDROXY 03/07/2023 27.8 (A)  30.0 - 100.0 ng/mL Final    Comment: Vitamin D deficiency has been defined by the [address] [place] [address] and an Endocrine Society practice guideline as a  level of serum 25-OH vitamin D less than 20 ng/mL (1,2).   The Endocrine Society went on to further define vitamin D  insufficiency as a level between 21 and 29 ng/mL (2). 1. IOM ([place] [employer]). 2010. Dietary reference     intakes for calcium and D. [address] [facility]: The     [employer]. 2. Mark MF, Sohail [place], Tapan ALVAREZ, et al.     Evaluation, treatment, and prevention of vitamin D     deficiency: an Endocrine Society clinical practice     guideline. JCEM. 2011 Jul; 96(7):1911-30. Glucose 03/07/2023 87  70 - 99 mg/dL Final    BUN 03/07/2023 14  6 - 20 mg/dL Final    Creatinine 03/07/2023 0.64  0.57 - 1.00 mg/dL Final    eGFR 03/07/2023 117  >59 mL/min/1.73 Final    BUN/Creatinine ratio 03/07/2023 22  9 - 23 Final    Sodium 03/07/2023 143  134 - 144 mmol/L Final    Potassium 03/07/2023 4.4  3.5 - 5.2 mmol/L Final    Chloride 03/07/2023 104  96 - 106 mmol/L Final    CO2 03/07/2023 23  20 - 29 mmol/L Final    Calcium 03/07/2023 9.5  8.7 - 10.2 mg/dL Final    Protein, total 03/07/2023 7.3  6.0 - 8.5 g/dL Final    Albumin 03/07/2023 4.7  3.8 - 4.8 g/dL Final    GLOBULIN, TOTAL 03/07/2023 2.6  1.5 - 4.5 g/dL Final    A-G Ratio 03/07/2023 1.8  1.2 - 2.2 Final    Bilirubin, total 03/07/2023 0.5  0.0 - 1.2 mg/dL Final    Alk.  phosphatase 03/07/2023 55  44 - 121 IU/L Final    AST (SGOT) 03/07/2023 25  0 - 40 IU/L Final    ALT (SGPT) 03/07/2023 33 (A)  0 - 32 IU/L Final    Cholesterol, total 03/07/2023 157  100 - 199 mg/dL Final    Triglyceride 03/07/2023 93  0 - 149 mg/dL Final    HDL Cholesterol 03/07/2023 41  >39 mg/dL Final    VLDL, calculated 03/07/2023 17  5 - 40 mg/dL Final    LDL, calculated 03/07/2023 99  0 - 99 mg/dL Final    WBC 03/07/2023 4.6  3.4 - 10.8 x10E3/uL Final    RBC 03/07/2023 4.76  3.77 - 5.28 x10E6/uL Final    HGB 03/07/2023 14.2  11.1 - 15.9 g/dL Final    HCT 03/07/2023 41.7  34.0 - 46.6 % Final    MCV 03/07/2023 88  79 - 97 fL Final    MCH 03/07/2023 29.8  26.6 - 33.0 pg Final    MCHC 03/07/2023 34.1  31.5 - 35.7 g/dL Final    RDW 03/07/2023 12.0  11.7 - 15.4 % Final PLATELET 05/90/4546 522  150 - 450 x10E3/uL Final    NEUTROPHILS 03/07/2023 57  Not Estab. % Final    Lymphocytes 03/07/2023 30  Not Estab. % Final    MONOCYTES 03/07/2023 8  Not Estab. % Final    EOSINOPHILS 03/07/2023 4  Not Estab. % Final    BASOPHILS 03/07/2023 1  Not Estab. % Final    ABS. NEUTROPHILS 03/07/2023 2.6  1.4 - 7.0 x10E3/uL Final    Abs Lymphocytes 03/07/2023 1.4  0.7 - 3.1 x10E3/uL Final    ABS. MONOCYTES 03/07/2023 0.4  0.1 - 0.9 x10E3/uL Final    ABS. EOSINOPHILS 03/07/2023 0.2  0.0 - 0.4 x10E3/uL Final    ABS. BASOPHILS 03/07/2023 0.0  0.0 - 0.2 x10E3/uL Final    IMMATURE GRANULOCYTES 03/07/2023 0  Not Estab. % Final    ABS. IMM. GRANS. 03/07/2023 0.0  0.0 - 0.1 x10E3/uL Final    TSH 03/07/2023 2.000  0.450 - 4.500 uIU/mL Final    Comment: No apparent thyroid disorder. Additional testing not indicated. In  rare instances, Secondary Hypothyroidism as well as Subclinical  Hypothyroidism have been reported in some patients with normal TSH  values. Hepatitis A Ab, IgM 03/07/2023 Negative  Negative Final    Hep B surface Ag screen 03/07/2023 Negative  Negative Final    Hep B Core Ab, IgM 03/07/2023 Negative  Negative Final    HCV Ab 03/07/2023 Non Reactive  Non Reactive Final    HCV Interpretation 03/07/2023 Comment   Final    Comment: Not infected with HCV unless early or acute infection is  suspected (which may be delayed in an immunocompromised  individual), or other evidence exists to indicate HCV infection. M. genitalium by THANH 03/07/2023 Negative  Negative Final    C. trachomatis by THANH 03/07/2023 Negative  Negative Final    N. gonorrhoeae by THANH 03/07/2023 Negative  Negative Final    HSV 1 Ab, IgG, type spec.  03/07/2023 56.30 (A)  0.00 - 0.90 index Final    Comment:                                  Negative        <0.91                                   Equivocal 0.91 - 1.09                                   Positive        >1.09   Note: Negative indicates no antibodies detected to HSV-1. Equivocal may suggest early infection. If   clinically appropriate, retest at later date. Positive   indicates antibodies detected to HSV-1.      HSV 2 Ab IgG, type spec. 03/07/2023 <0.91  0.00 - 0.90 index Final    Comment:                                  Negative        <0.91                                   Equivocal 0.91 - 1.09                                   Positive        >1.09   Note: Negative indicates no HSV-2 antibodies detected. Positive indicates HSV-2 antibodies detected. Equivocal and low positive HSV-2 screens   (Index 0.91-5.00) may be false positive and are   reflexed to supplemental testing in accordance with   CDC guidelines. HIV SCREEN 4TH GENERATION WRFX 03/07/2023 Non Reactive  Non Reactive Final    Comment: HIV Negative  HIV-1/HIV-2 antibodies and HIV-1 p24 antigen were NOT detected. There is no laboratory evidence of HIV infection. RPR 03/07/2023 Non Reactive  Non Reactive Final      MRI HIP LT W WO CONT  Narrative: EXAM: MRI HIP LT W WO CONT    INDICATION: Chronic left buttock mass previously described as likely fat  necrosis, evaluate for change. COMPARISON: MRI left hip on 1/9/2020. Left buttock soft tissue ultrasound on  12/31/2019. TECHNIQUE: Coronal T1 and axial T2 fat-saturated MRI of the whole pelvis; axial  and sagittal T2 fat-saturated; coronal proton density fat-saturated MRI of the  left hip . CONTRAST: Pre and post IV contrast 10 mL ProHance    FINDINGS: Bone marrow: Within normal limits. No acute fracture, dislocation, or  marrow replacing process. Joint fluid: None. Articular cartilage: Intact. Acetabular labrum: Nondisplaced tear of the base of the superior labrum was not  imaged on the comparison study. Blunted free edge of the left acetabular labrum. Hip morphology: Normal concavity of the femoral head-neck junction. No  acetabular overcoverage or retroversion. Tendons: Intact. Muscles: Within normal limits.      Soft tissue mass: None. Chronic fat necrosis with central fat signal in the left  gluteal subcutaneous adipose tissues previously measured up to 2.6 cm and now  measures up to 1.4 cm. No pathologic enhancement. No involvement of the gluteus  garrick muscle. No new suspicious finding. Intrapelvic soft tissues: no acute process. Intrauterine device is in good  position. Impression: 1. Decreased chronic fat necrosis in the left buttock subcutaneous adipose  tissues. No imaging requirement for follow-up. 2. No suspicious mass or evidence of malignant neoplasm. 3. Nondisplaced left acetabular labral tear may be chronic. Current Outpatient Medications   Medication Sig Dispense Refill    cholecalciferol (Vitamin D3) 25 mcg (1,000 unit) cap Take 1,000 Units by mouth daily. multivitamin no. 58-folic acid 6,508 mcg chew Take 1 Dose by mouth.      mv,Ca,min-iron gluc-FA-biotin (Hair,Skin and Nails) 1 mg iron-66.7 mcg-1,000 mcg tab Take 1 Dose by mouth. omeprazole (PRILOSEC) 20 mg capsule Take 1 Capsule by mouth daily. 30 Capsule 1           The past medical history, past surgical history, and family history were reviewed and updated in the medical record. Lab values/Imaging were reviewed. The medications were reviewed and updated in the medical record. Immunizations were reviewed and updated in the medical record. All relevant preventative screenings reviewed and updated in the medical record. REVIEW OF SYSTEMS   Review of Systems   Constitutional:  Negative for chills, diaphoresis, fever, malaise/fatigue and weight loss. Respiratory:  Negative for cough, shortness of breath and wheezing. Cardiovascular:  Negative for chest pain, palpitations and leg swelling. Gastrointestinal:  Negative for constipation, diarrhea, nausea and vomiting. Genitourinary:  Negative for dysuria. Musculoskeletal:  Positive for joint pain. Negative for falls and myalgias. Neurological:  Positive for headaches. Psychiatric/Behavioral:  Negative for depression. The patient is not nervous/anxious. PHYSICAL EXAM   BP 99/65 (BP 1 Location: Left upper arm, BP Patient Position: Sitting, BP Cuff Size: Adult)   Pulse 66   Temp 97.8 °F (36.6 °C) (Temporal)   Resp 18   Ht 5' (1.524 m)   Wt 120 lb 3.2 oz (54.5 kg)   SpO2 98%   BMI 23.47 kg/m²      Physical Exam  Vitals and nursing note reviewed. Constitutional:       General: She is not in acute distress. Appearance: Normal appearance. She is obese. HENT:      Head: Normocephalic and atraumatic. Right Ear: Tympanic membrane, ear canal and external ear normal.      Left Ear: Tympanic membrane, ear canal and external ear normal.      Nose: Nose normal. No congestion. Mouth/Throat:      Mouth: Mucous membranes are moist.      Pharynx: Oropharynx is clear. Eyes:      Conjunctiva/sclera: Conjunctivae normal.      Pupils: Pupils are equal, round, and reactive to light. Neck:      Thyroid: No thyroid mass, thyromegaly or thyroid tenderness. Cardiovascular:      Rate and Rhythm: Normal rate and regular rhythm. Pulses: Normal pulses. Dorsalis pedis pulses are 2+ on the right side and 2+ on the left side. Posterior tibial pulses are 2+ on the right side and 2+ on the left side. Heart sounds: Normal heart sounds. No murmur heard. Pulmonary:      Effort: Pulmonary effort is normal. No respiratory distress. Breath sounds: Normal breath sounds. No stridor. No wheezing or rales. Abdominal:      General: Bowel sounds are normal. There is no distension. Palpations: Abdomen is soft. There is no mass. Tenderness: There is no abdominal tenderness. There is no guarding or rebound. Hernia: No hernia is present. Musculoskeletal:         General: Normal range of motion. Cervical back: Normal range of motion and neck supple. Right hip: No tenderness. Normal range of motion. Left hip: Tenderness present. No lacerations or crepitus. Normal range of motion. Normal strength. Right knee: No crepitus. Left knee: No crepitus. Right lower leg: No edema. Left lower leg: No edema. Lymphadenopathy:      Cervical: No cervical adenopathy. Skin:     General: Skin is warm and dry. Capillary Refill: Capillary refill takes less than 2 seconds. Neurological:      General: No focal deficit present. Mental Status: She is alert and oriented to person, place, and time. Mental status is at baseline. Cranial Nerves: No cranial nerve deficit. Sensory: No sensory deficit. Motor: No weakness. Gait: Gait normal.      Deep Tendon Reflexes:      Reflex Scores:       Patellar reflexes are 2+ on the right side and 2+ on the left side. Psychiatric:         Mood and Affect: Mood normal.         Behavior: Behavior normal.          ASSESSMENT/ PLAN   Below is the assessment and plan developed based on review of pertinent history, physical exam, labs, studies, and medications. 1. Tear of left acetabular labrum, initial encounter  -     Incidental finding on MRI assessing known left hip mass. - I referred her to Dr Zane Angel  2. Chronic left hip pain  - I referred her to Dr Teodora Lemon, Orthopedics  - Discussed pain management with oral diclofenac as needed. Patient declines, would like to try topical ointment for pain first.  - I prescribed Voltaren gel as needed for pain. Potential side effects were discussed. - REFERRAL TO ORTHOPEDICS  -     diclofenac (VOLTAREN) 1 % gel; Apply 2 g to affected area four (4) times daily as needed for Pain., Normal, Disp-1 Each, R-1  3. Well woman exam without gynecological exam  - Physical exam, review of systems completed as above. - Labs ordered prior to appointment reviewed with patient in detail. All questions answered. - Health screenings reviewed, UTD.  - Immunizations reviewed.    4. Acute nonintractable headache, unspecified headache type   - Continue PRN tylenol for headache. If headache frequency or severity increases, return to clinic. 5. Vitamin D deficiency  - Continue taking vitamin D supplement daily. Disclaimer:  Advised patient to call back or return to office if symptoms worsen/change/persist.  Discussed expected course/resolution/complications of diagnosis in detail with patient. Medication risks/benefits/alternatives discussed with patient. Patient was given an after visit summary which includes diagnoses, current medications, & vitals. Discussed patient instructions and advised to read to all patient instructions regarding care. Patient expressed understanding with the diagnosis and plan.        Sukhdev Lopez NP  3/30/2023

## 2023-05-20 RX ORDER — OMEPRAZOLE 20 MG/1
20 CAPSULE, DELAYED RELEASE ORAL DAILY
COMMUNITY
Start: 2023-03-06

## 2023-11-15 ENCOUNTER — TELEPHONE (OUTPATIENT)
Age: 38
End: 2023-11-15

## 2023-11-15 NOTE — CONSULTS
Session ID: [id number]  Language: Delmer Gomes   ID: #[id number]   Name: Harley [place]: Jaida   ID: #[id number]   Name: Claudia You

## 2023-11-15 NOTE — TELEPHONE ENCOUNTER
Called and spoke with patient. Informed her that Dr. Gutierrez Officer is being called to the hospital for an emergency. Patient  has been rescheduled.

## 2023-12-07 ENCOUNTER — OFFICE VISIT (OUTPATIENT)
Age: 38
End: 2023-12-07
Payer: MEDICAID

## 2023-12-07 VITALS
WEIGHT: 125 LBS | SYSTOLIC BLOOD PRESSURE: 100 MMHG | DIASTOLIC BLOOD PRESSURE: 70 MMHG | HEIGHT: 60 IN | BODY MASS INDEX: 24.54 KG/M2

## 2023-12-07 DIAGNOSIS — N76.0 ACUTE VAGINITIS: ICD-10-CM

## 2023-12-07 DIAGNOSIS — Z01.419 WOMEN'S ANNUAL ROUTINE GYNECOLOGICAL EXAMINATION: Primary | ICD-10-CM

## 2023-12-07 PROCEDURE — 99395 PREV VISIT EST AGE 18-39: CPT | Performed by: OBSTETRICS & GYNECOLOGY

## 2023-12-07 NOTE — PROGRESS NOTES
Annual exam  No chief complaint on file. Cr Han is a 45 y.o. presenting for annual exam. Her main concerns today include    She {declines/accept:[id number]} a chaperone during the gynecologic exam today. Ob/Gyn Hx:    LMP - ***  Menses - ***   Contraception - ***  STI - ***  SA - {YES / [id number]     Health maintenance:  Pap - 2021, NILM  Gardasil - ***      Past Medical History:   Diagnosis Date    Acid indigestion     Back pain     Frequent headaches     Headache        Past Surgical History:   Procedure Laterality Date    GYN      x3 vaginal deliveries       Family History   Problem Relation Age of Onset    Hypertension Father     Cancer Mother     Hypertension Mother        Social History     Socioeconomic History    Marital status:      Spouse name: Not on file    Number of children: Not on file    Years of education: Not on file    Highest education level: Not on file   Occupational History    Not on file   Tobacco Use    Smoking status: Never    Smokeless tobacco: Never   Substance and Sexual Activity    Alcohol use: No    Drug use: No    Sexual activity: Not on file   Other Topics Concern    Not on file   Social History Narrative    Not on file     Social Determinants of Health     Financial Resource Strain: Not on file   Food Insecurity: Not on file   Transportation Needs: Not on file   Physical Activity: Not on file   Stress: Not on file   Social Connections: Not on file   Intimate Partner Violence: Not on file   Housing Stability: Not on file       Current Outpatient Medications   Medication Sig Dispense Refill    vitamin D 25 MCG (1000 UT) CAPS Take 1 capsule by mouth daily      diclofenac sodium (VOLTAREN) 1 % GEL Apply 2 g topically 4 times daily as needed      omeprazole (PRILOSEC) 20 MG delayed release capsule Take 1 capsule by mouth daily       No current facility-administered medications for this visit.        Not on File    Review of Systems - History obtained from

## 2023-12-07 NOTE — PROGRESS NOTES
Annual Exam    Chief Complaint   Patient presents with    Annual Exam       Juan Bajwa is a 45 y.o. presenting for annual exam. She mentions occasional vaginal odor with vaginal discharge. She has no other complaints/concerns. She was told that her IUD needed to be removed after 5 years. She has had it in place since 2017. She has spotting with her IUD. She does not have any other complaints. She declines a chaperone during the gynecologic exam today. Ob/Gyn Hx:    LMP - Mirena IUD  Menses - Irregular spotting with IUD   Contraception - Mirena  STI - No history of   SA - Yes     Health maintenance:  Pap - 2021 NILM,HPV Negative  Gardasil - 3 of 3    Past Medical History:   Diagnosis Date    Acid indigestion     Back pain     Frequent headaches     Headache     IUD (intrauterine device) in place     Mirena inserted        Past Surgical History:   Procedure Laterality Date    GYN      x3 vaginal deliveries       Family History   Problem Relation Age of Onset    Hypertension Father     Cancer Mother     Hypertension Mother        Social History     Socioeconomic History    Marital status:      Spouse name: Not on file    Number of children: Not on file    Years of education: Not on file    Highest education level: Not on file   Occupational History    Not on file   Tobacco Use    Smoking status: Never    Smokeless tobacco: Never   Vaping Use    Vaping Use: Never used   Substance and Sexual Activity    Alcohol use: No    Drug use: No    Sexual activity: Yes     Partners: Male     Birth control/protection: I.U.D.    Other Topics Concern    Not on file   Social History Narrative    Not on file     Social Determinants of Health     Financial Resource Strain: Not on file   Food Insecurity: Not on file   Transportation Needs: Not on file   Physical Activity: Not on file   Stress: Not on file   Social Connections: Not on file   Intimate Partner Violence: Not on file   Housing

## 2023-12-09 LAB
A VAGINAE DNA VAG QL NAA+PROBE: NORMAL SCORE
BVAB2 DNA VAG QL NAA+PROBE: NORMAL SCORE
C ALBICANS DNA VAG QL NAA+PROBE: NEGATIVE
C GLABRATA DNA VAG QL NAA+PROBE: NEGATIVE
MEGA1 DNA VAG QL NAA+PROBE: NORMAL SCORE

## 2023-12-26 ENCOUNTER — OFFICE VISIT (OUTPATIENT)
Age: 38
End: 2023-12-26

## 2023-12-26 VITALS — DIASTOLIC BLOOD PRESSURE: 78 MMHG | BODY MASS INDEX: 24.02 KG/M2 | SYSTOLIC BLOOD PRESSURE: 100 MMHG | WEIGHT: 123 LBS

## 2023-12-26 DIAGNOSIS — Z30.431 ENCOUNTER FOR ROUTINE CHECKING OF INTRAUTERINE CONTRACEPTIVE DEVICE (IUD): Primary | ICD-10-CM

## 2023-12-26 PROCEDURE — 0502F SUBSEQUENT PRENATAL CARE: CPT | Performed by: OBSTETRICS & GYNECOLOGY

## 2023-12-26 NOTE — PROGRESS NOTES
Problem Visit    Chief Complaint   Patient presents with    IUD check       Zoila Whiting is a 45 y.o.  presenting for problem visit. Her main concern today is having her IUD placement checked. She was last seen for an annual exam and IUD strings were not visualized. Given this, she was recommended to follow up with ultrasound to confirm placement. IUD was placed in 2017. Pap test and STD testing all negative at appointment on       Ob/Gyn Hx:    -  LMP- IUD  Menses- Irregular with IUD in place  Contraception- IUD  SA- Yes    US Report:  THE UTERUS IS ANTEVERTED, NORMAL IN SIZE AND ECHOGENICITY. THE ENDOMETRIUM MERASURES 4.3MM IN THICKNESS. THE IUD APPEARS TO BE CORRECTLY PLACED IN THE FUNDAL PORTION OF THE ENDOMETRIAL LINING. RIGHT OVARY APPEARS WNL. LEFT OVARY APPEARS WNL. NO FREE FLUID IS SEEN IN THE CDS      Past Medical History:   Diagnosis Date    Acid indigestion     Back pain     Frequent headaches     Headache     IUD (intrauterine device) in place     Mirena inserted        Past Surgical History:   Procedure Laterality Date    GYN      x3 vaginal deliveries       Family History   Problem Relation Age of Onset    Hypertension Father     Cancer Mother     Hypertension Mother        Social History     Socioeconomic History    Marital status:      Spouse name: Not on file    Number of children: Not on file    Years of education: Not on file    Highest education level: Not on file   Occupational History    Not on file   Tobacco Use    Smoking status: Never    Smokeless tobacco: Never   Vaping Use    Vaping Use: Never used   Substance and Sexual Activity    Alcohol use: No    Drug use: No    Sexual activity: Yes     Partners: Male     Birth control/protection: I.U.D.    Other Topics Concern    Not on file   Social History Narrative    Not on file     Social Determinants of Health     Financial Resource Strain: Not on file   Food Insecurity: Not on file   Transportation Needs: Not on

## 2024-01-18 ENCOUNTER — TELEPHONE (OUTPATIENT)
Dept: PRIMARY CARE CLINIC | Facility: CLINIC | Age: 39
End: 2024-01-18

## 2024-01-18 NOTE — TELEPHONE ENCOUNTER
----- Message from Jenifer Panchito sent at 1/18/2024 12:15 PM EST -----  Subject: Message to Provider    QUESTIONS  Information for Provider? Patient is returning a call to providers office   . WT no answer at office. Please contact patient  ---------------------------------------------------------------------------  --------------  CALL BACK INFO  [phone]; OK to leave message on voicemail  ---------------------------------------------------------------------------  --------------  SCRIPT ANSWERS  undefined

## 2024-04-14 ENCOUNTER — OFFICE VISIT (OUTPATIENT)
Age: 39
End: 2024-04-14

## 2024-04-14 VITALS
BODY MASS INDEX: 23.56 KG/M2 | SYSTOLIC BLOOD PRESSURE: 112 MMHG | HEART RATE: 75 BPM | OXYGEN SATURATION: 97 % | DIASTOLIC BLOOD PRESSURE: 73 MMHG | TEMPERATURE: 98.1 F | WEIGHT: 120 LBS | RESPIRATION RATE: 18 BRPM | HEIGHT: 60 IN

## 2024-04-14 DIAGNOSIS — R05.1 ACUTE COUGH: ICD-10-CM

## 2024-04-14 DIAGNOSIS — J40 BRONCHITIS: Primary | ICD-10-CM

## 2024-04-14 RX ORDER — ALBUTEROL SULFATE 90 UG/1
2 AEROSOL, METERED RESPIRATORY (INHALATION) EVERY 4 HOURS PRN
Qty: 18 G | Refills: 0 | Status: SHIPPED | OUTPATIENT
Start: 2024-04-14

## 2024-04-14 RX ORDER — AZITHROMYCIN 250 MG/1
TABLET, FILM COATED ORAL
Qty: 6 TABLET | Refills: 0 | Status: SHIPPED | OUTPATIENT
Start: 2024-04-14 | End: 2024-04-24

## 2024-04-14 RX ORDER — BENZONATATE 200 MG/1
200 CAPSULE ORAL 3 TIMES DAILY PRN
Qty: 21 CAPSULE | Refills: 0 | Status: SHIPPED | OUTPATIENT
Start: 2024-04-14 | End: 2024-04-21

## 2024-04-22 ENCOUNTER — OFFICE VISIT (OUTPATIENT)
Dept: PRIMARY CARE CLINIC | Facility: CLINIC | Age: 39
End: 2024-04-22
Payer: COMMERCIAL

## 2024-04-22 VITALS
OXYGEN SATURATION: 97 % | DIASTOLIC BLOOD PRESSURE: 67 MMHG | BODY MASS INDEX: 23.75 KG/M2 | TEMPERATURE: 97.3 F | RESPIRATION RATE: 16 BRPM | HEIGHT: 60 IN | HEART RATE: 65 BPM | SYSTOLIC BLOOD PRESSURE: 102 MMHG | WEIGHT: 121 LBS

## 2024-04-22 DIAGNOSIS — R10.13 EPIGASTRIC PAIN: ICD-10-CM

## 2024-04-22 DIAGNOSIS — Z13.1 SCREENING FOR DIABETES MELLITUS: ICD-10-CM

## 2024-04-22 DIAGNOSIS — L65.9 HAIR LOSS: ICD-10-CM

## 2024-04-22 DIAGNOSIS — Z83.3 FAMILY HISTORY OF DIABETES MELLITUS: Primary | ICD-10-CM

## 2024-04-22 DIAGNOSIS — Z83.3 FAMILY HISTORY OF DIABETES MELLITUS: ICD-10-CM

## 2024-04-22 PROCEDURE — 99214 OFFICE O/P EST MOD 30 MIN: CPT | Performed by: FAMILY MEDICINE

## 2024-04-22 RX ORDER — OMEPRAZOLE 20 MG/1
20 CAPSULE, DELAYED RELEASE ORAL
Qty: 30 CAPSULE | Refills: 11 | Status: SHIPPED | OUTPATIENT
Start: 2024-04-22

## 2024-04-22 SDOH — ECONOMIC STABILITY: INCOME INSECURITY: HOW HARD IS IT FOR YOU TO PAY FOR THE VERY BASICS LIKE FOOD, HOUSING, MEDICAL CARE, AND HEATING?: NOT VERY HARD

## 2024-04-22 SDOH — ECONOMIC STABILITY: FOOD INSECURITY: WITHIN THE PAST 12 MONTHS, YOU WORRIED THAT YOUR FOOD WOULD RUN OUT BEFORE YOU GOT MONEY TO BUY MORE.: NEVER TRUE

## 2024-04-22 SDOH — ECONOMIC STABILITY: HOUSING INSECURITY
IN THE LAST 12 MONTHS, WAS THERE A TIME WHEN YOU DID NOT HAVE A STEADY PLACE TO SLEEP OR SLEPT IN A SHELTER (INCLUDING NOW)?: NO

## 2024-04-22 SDOH — ECONOMIC STABILITY: FOOD INSECURITY: WITHIN THE PAST 12 MONTHS, THE FOOD YOU BOUGHT JUST DIDN'T LAST AND YOU DIDN'T HAVE MONEY TO GET MORE.: NEVER TRUE

## 2024-04-22 ASSESSMENT — PATIENT HEALTH QUESTIONNAIRE - PHQ9
SUM OF ALL RESPONSES TO PHQ QUESTIONS 1-9: 0
SUM OF ALL RESPONSES TO PHQ QUESTIONS 1-9: 0
2. FEELING DOWN, DEPRESSED OR HOPELESS: NOT AT ALL
SUM OF ALL RESPONSES TO PHQ9 QUESTIONS 1 & 2: 0
SUM OF ALL RESPONSES TO PHQ QUESTIONS 1-9: 0
SUM OF ALL RESPONSES TO PHQ QUESTIONS 1-9: 0
1. LITTLE INTEREST OR PLEASURE IN DOING THINGS: NOT AT ALL

## 2024-04-22 NOTE — PROGRESS NOTES
HPI     Chief Complaint   Patient presents with    Established New Doctor     She is a 38 y.o. female who presents to establish care.    Establishing Care    C/o epigastric discomfort, acid reflux. Chronic intermittent problem. Occurs with certain foods or when she is upset. Uses omeprazole prn which is effective. Denies nsaid or alcohol use.   Denies weight loss or abnormal signs of bleeding.     C/o generalized hair loss in the past year.   Taking a Vitamin D.    Has 3 children at home.  Diet is generally healthy.     Chronic intermittent epigastric discom    - Chronic medical problems:  Past Medical History:   Diagnosis Date    Acid indigestion     Back pain     Frequent headaches     Headache     IUD (intrauterine device) in place     Mirena inserted 2017     - Current medications:   Current Outpatient Medications   Medication Sig    albuterol sulfate HFA (VENTOLIN HFA) 108 (90 Base) MCG/ACT inhaler Inhale 2 puffs into the lungs every 4 hours as needed for Wheezing    vitamin D 25 MCG (1000 UT) CAPS Take 1 capsule by mouth daily     No current facility-administered medications for this visit.     - Family history:   Family History   Problem Relation Age of Onset    Hypertension Father     Cancer Mother     Hypertension Mother      - Allergies: No Known Allergies  - Surgical history:   Past Surgical History:   Procedure Laterality Date    GYN      x3 vaginal deliveries     - Social history (sexually active, occupation, smoker, etoh use, etc):   Social History     Socioeconomic History    Marital status:      Spouse name: Not on file    Number of children: Not on file    Years of education: Not on file    Highest education level: Not on file   Occupational History    Not on file   Tobacco Use    Smoking status: Never    Smokeless tobacco: Never   Vaping Use    Vaping Use: Never used   Substance and Sexual Activity    Alcohol use: No    Drug use: No    Sexual activity: Yes     Partners: Male     Birth

## 2024-04-22 NOTE — PROGRESS NOTES
\"Have you been to the ER, urgent care clinic since your last visit?  Hospitalized since your last visit?\"    2 weeks ago for cough    “Have you seen or consulted any other health care providers outside of [facility] since your last visit?”    Urgent care for cough            Click Here for Release of Records Request

## 2024-04-23 ENCOUNTER — TELEPHONE (OUTPATIENT)
Dept: PRIMARY CARE CLINIC | Facility: CLINIC | Age: 39
End: 2024-04-23

## 2024-04-23 LAB
25(OH)D3 SERPL-MCNC: 48.4 NG/ML (ref 30–100)
ALBUMIN SERPL-MCNC: 4.2 G/DL (ref 3.5–5)
ALBUMIN/GLOB SERPL: 1.4 (ref 1.1–2.2)
ALP SERPL-CCNC: 53 U/L (ref 45–117)
ALT SERPL-CCNC: 27 U/L (ref 12–78)
ANION GAP SERPL CALC-SCNC: 1 MMOL/L (ref 5–15)
AST SERPL-CCNC: 16 U/L (ref 15–37)
BILIRUB SERPL-MCNC: 0.8 MG/DL (ref 0.2–1)
BUN SERPL-MCNC: 13 MG/DL (ref 6–20)
BUN/CREAT SERPL: 23 (ref 12–20)
CALCIUM SERPL-MCNC: 9 MG/DL (ref 8.5–10.1)
CHLORIDE SERPL-SCNC: 107 MMOL/L (ref 97–108)
CO2 SERPL-SCNC: 31 MMOL/L (ref 21–32)
CREAT SERPL-MCNC: 0.57 MG/DL (ref 0.55–1.02)
ERYTHROCYTE [DISTWIDTH] IN BLOOD BY AUTOMATED COUNT: 11.6 % (ref 11.5–14.5)
EST. AVERAGE GLUCOSE BLD GHB EST-MCNC: 97 MG/DL
GLOBULIN SER CALC-MCNC: 3.1 G/DL (ref 2–4)
GLUCOSE SERPL-MCNC: 83 MG/DL (ref 65–100)
HBA1C MFR BLD: 5 % (ref 4–5.6)
HCT VFR BLD AUTO: 38.9 % (ref 35–47)
HGB BLD-MCNC: 13.1 G/DL (ref 11.5–16)
LIPASE SERPL-CCNC: 34 U/L (ref 13–75)
MCH RBC QN AUTO: 29.4 PG (ref 26–34)
MCHC RBC AUTO-ENTMCNC: 33.7 G/DL (ref 30–36.5)
MCV RBC AUTO: 87.4 FL (ref 80–99)
NRBC # BLD: 0 K/UL (ref 0–0.01)
NRBC BLD-RTO: 0 PER 100 WBC
PLATELET # BLD AUTO: 261 K/UL (ref 150–400)
PMV BLD AUTO: 10.3 FL (ref 8.9–12.9)
POTASSIUM SERPL-SCNC: 3.9 MMOL/L (ref 3.5–5.1)
PROT SERPL-MCNC: 7.3 G/DL (ref 6.4–8.2)
RBC # BLD AUTO: 4.45 M/UL (ref 3.8–5.2)
SODIUM SERPL-SCNC: 139 MMOL/L (ref 136–145)
TSH SERPL DL<=0.05 MIU/L-ACNC: 1.02 UIU/ML (ref 0.36–3.74)
WBC # BLD AUTO: 4.1 K/UL (ref 3.6–11)

## 2024-06-04 ENCOUNTER — TELEPHONE (OUTPATIENT)
Dept: PRIMARY CARE CLINIC | Facility: CLINIC | Age: 39
End: 2024-06-04

## 2024-06-04 NOTE — TELEPHONE ENCOUNTER
Spoke pt who states that she had her US done at [facility] on 05/28/2024. Pt made aware that we can not see [facility] doctors records but that we can request them and after that the doctor will need to look at the US before she is able to get results. Pt expressed understanding.

## 2024-06-11 ENCOUNTER — TELEPHONE (OUTPATIENT)
Dept: PRIMARY CARE CLINIC | Facility: CLINIC | Age: 39
End: 2024-06-11

## 2024-06-11 NOTE — TELEPHONE ENCOUNTER
Patient calling to speak with the nurse about ultrasound she got at [facility]. She would like to know the results. Explain that we will have to request that information from [facility] since we are two different organization and this could take some time to get back.

## 2024-06-13 NOTE — TELEPHONE ENCOUNTER
Spoke to patient aware of US results. Pt expressed understanding and has no questions at this time.

## 2024-12-17 ENCOUNTER — OFFICE VISIT (OUTPATIENT)
Age: 39
End: 2024-12-17
Payer: COMMERCIAL

## 2024-12-17 VITALS
SYSTOLIC BLOOD PRESSURE: 116 MMHG | TEMPERATURE: 98.6 F | BODY MASS INDEX: 25.19 KG/M2 | HEART RATE: 71 BPM | WEIGHT: 129 LBS | OXYGEN SATURATION: 96 % | DIASTOLIC BLOOD PRESSURE: 74 MMHG

## 2024-12-17 DIAGNOSIS — R10.2 PELVIC PAIN: ICD-10-CM

## 2024-12-17 DIAGNOSIS — N89.8 VAGINAL DISCHARGE: ICD-10-CM

## 2024-12-17 DIAGNOSIS — N89.8 VAGINAL ODOR: Primary | ICD-10-CM

## 2024-12-17 PROCEDURE — 99213 OFFICE O/P EST LOW 20 MIN: CPT | Performed by: OBSTETRICS & GYNECOLOGY

## 2024-12-17 NOTE — PROGRESS NOTES
Amaya Buregss is a 39 y.o. female presents for a problem visit.      No LMP recorded. (Menstrual status: IUD).  Birth Control: IUD.  Last Pap: normal obtained 2023 normal  The patient is reporting having: lower pelvic pain, yellow vaginal discharge, and occasional itching  She reports the symptoms are is unchanged.      1. Have you been to the ER, urgent care clinic, or hospitalized since your last visit? No    2. Have you seen or consulted any other health care providers outside of the [facility] since your last visit? No    
Amaya Burgess is a 39 y.o. female  presents for a problem visit.    No chief complaint on file.      OB/GYN History  No obstetric history on file. - {delivery:61122}  Hx of STI - {STI HX:89422}  SA - {SA HX:04480}      No LMP recorded. (Menstrual status: IUD).  Menses: {Menses:10063}  Birth Control: IUD - Mirena        The patient is here for an IUD check related to ***      1. Have you been to the ER, urgent care clinic, or hospitalized since your last visit? {YES/NO:19726::\"Yes- When: ***. Where: ***.  Reason for visit: ***\"}  2. Have you seen or consulted any other health care providers outside of the [facility] since your last visit? {YES/NO:19726::\"Yes- When: ***. Where: ***.  Reason for visit: ***\"}      She {declines/accept:51261}  a chaperone during the gynecologic exam today.  
Exam      Constitutional  Appearance: well-nourished, well developed, alert, in no acute distress    HENT  Head and Face: appears normal    Neck  Inspection/Palpation: normal appearance, no masses or tenderness  Thyroid: gland size normal, nontender    Chest  Respiratory Effort: non-labored breathing    Cardiovascular  Extremities: no peripheral edema    Gastrointestinal  Abdominal Examination: abdomen non-distended, non-tender to palpation, no masses present  Liver and spleen: no hepatomegaly present, spleen not palpable  Hernias: no hernias identified    Genitourinary  External Genitalia: normal appearance for age, no discharge present, no tenderness present, no inflammatory lesions present, no masses present, no atrophy present  Vagina: normal vaginal vault without central or paravaginal defects, no discharge present, no inflammatory lesions present, no masses present  Bladder: non-tender to palpation  Urethra: appears normal  Cervix: normal, IUD strings NOT visualized   Uterus: normal size, shape and consistency  Adnexa: no adnexal tenderness present, no adnexal masses present  Perineum: perineum within normal limits, no evidence of trauma, no rashes or skin lesions present    Skin  General Inspection: no rash, no lesions identified    Neurologic/Psychiatric  Mental Status:  Orientation: grossly oriented to person, place and time  Mood and Affect: mood normal, affect appropriate      Assessment/Plan:  Amaya Burgess is a 39 y.o. who presents for the following problems.    1. Vaginal odor  - Nuswab    2. Vaginal discharge  - Nuswab    3. Pelvic pain  - Normal pelvic exam. No pain on examination  - Nuswab    4. Contraception  - Discussed IUD removal and re-insertion. She is undecided regarding which method she desires. We provided her information on both Mirena IUD and Nexplanon. She will schedule appointment for IUD removal and either IUD replacement or Nexplanon placement. IUD strings were again NOT visualized

## 2024-12-20 LAB
A VAGINAE DNA VAG QL NAA+PROBE: NORMAL SCORE
BVAB2 DNA VAG QL NAA+PROBE: NORMAL SCORE
C ALBICANS DNA VAG QL NAA+PROBE: NEGATIVE
C GLABRATA DNA VAG QL NAA+PROBE: NEGATIVE
C TRACH DNA SPEC QL NAA+PROBE: NEGATIVE
MEGA1 DNA VAG QL NAA+PROBE: NORMAL SCORE
N GONORRHOEA DNA VAG QL NAA+PROBE: NEGATIVE
T VAGINALIS DNA VAG QL NAA+PROBE: NEGATIVE

## 2025-01-24 NOTE — PROGRESS NOTES
Amaya Burgess is a 39 y.o. female  presents for a procedural visit.    No chief complaint on file.      OB/GYN History   -  x 3  Hx of STI - No  SA - Yes, Male      No LMP recorded. (Menstrual status: IUD).  Menses: Non-Existent due to IUD  Contraception: IUD - Mirena       UPT Needed: No - Not Applicable  Unprotected intercourse in the last two weeks? No      The patient is here for a IUD removal.      1. Have you been to the ER, urgent care clinic, or hospitalized since your last visit? No  2. Have you seen or consulted any other health care providers outside of the [facility] since your last visit? No      She declines  a chaperone during the gynecologic exam today.

## 2025-01-27 ENCOUNTER — OFFICE VISIT (OUTPATIENT)
Age: 40
End: 2025-01-27

## 2025-01-27 VITALS
WEIGHT: 127.6 LBS | SYSTOLIC BLOOD PRESSURE: 131 MMHG | BODY MASS INDEX: 24.92 KG/M2 | RESPIRATION RATE: 16 BRPM | DIASTOLIC BLOOD PRESSURE: 76 MMHG | HEART RATE: 68 BPM | TEMPERATURE: 98 F | OXYGEN SATURATION: 98 %

## 2025-01-27 DIAGNOSIS — Z30.432 ENCOUNTER FOR IUD REMOVAL: Primary | ICD-10-CM

## 2025-01-27 SDOH — ECONOMIC STABILITY: FOOD INSECURITY: WITHIN THE PAST 12 MONTHS, THE FOOD YOU BOUGHT JUST DIDN'T LAST AND YOU DIDN'T HAVE MONEY TO GET MORE.: NEVER TRUE

## 2025-01-27 SDOH — ECONOMIC STABILITY: FOOD INSECURITY: WITHIN THE PAST 12 MONTHS, YOU WORRIED THAT YOUR FOOD WOULD RUN OUT BEFORE YOU GOT MONEY TO BUY MORE.: NEVER TRUE

## 2025-01-27 ASSESSMENT — PATIENT HEALTH QUESTIONNAIRE - PHQ9
2. FEELING DOWN, DEPRESSED OR HOPELESS: NOT AT ALL
SUM OF ALL RESPONSES TO PHQ QUESTIONS 1-9: 0
1. LITTLE INTEREST OR PLEASURE IN DOING THINGS: NOT AT ALL
SUM OF ALL RESPONSES TO PHQ QUESTIONS 1-9: 0
SUM OF ALL RESPONSES TO PHQ9 QUESTIONS 1 & 2: 0

## 2025-01-27 NOTE — PROGRESS NOTES
IUD REMOVAL    Chart reviewed for the following:  I have reviewed the History & Physical and updated Amaya Burgess allergies.    TIME OUT performed immediately prior to start of procedure:  I performed the following reviews on Amaya Burgess prior to the start of the procedure:  Patient was identified by name and date of birth   Agreement on procedure being performed was verified  Risks and Benefits explained to the patient  Consent was signed and verified  Time: 3:20 PM  Date of procedure: 2025  Procedure performed by:  Dr. Wright  How tolerated by patient: Well  Post Procedural Pain Scale: 2-Hurts Minimally  Comments: None      Indications for Removal:  Amaya Burgess is a ,  39 y.o. female Other whose No LMP recorded. (Menstrual status: IUD).  . who presents today for IUD removal. Her current IUD was placed 8 years ago. She requests removal of the IUD because of expiration. The IUD removal procedure was discussed with the patient and she had no further questions.     Procedure: The patient was placed in a dorsal lithotomy position and appropriately draped. On bimanual exam the uterus was anterior and normal in size with no tenderness present. A speculum exam was performed and the cervix was visualized. The cervix was prepped with zephiran solution. The IUD string was visualized. Using ring forceps , the string was grasped and the IUD removed intact. The IUD was shown to the patient.     She is planning to use condoms.    Geri Wright MD

## 2025-07-22 ENCOUNTER — OFFICE VISIT (OUTPATIENT)
Facility: CLINIC | Age: 40
End: 2025-07-22
Payer: COMMERCIAL

## 2025-07-22 VITALS
DIASTOLIC BLOOD PRESSURE: 70 MMHG | RESPIRATION RATE: 16 BRPM | TEMPERATURE: 97.5 F | HEART RATE: 56 BPM | SYSTOLIC BLOOD PRESSURE: 109 MMHG | WEIGHT: 119 LBS | BODY MASS INDEX: 19.83 KG/M2 | HEIGHT: 65 IN | OXYGEN SATURATION: 99 %

## 2025-07-22 DIAGNOSIS — E55.9 VITAMIN D DEFICIENCY: ICD-10-CM

## 2025-07-22 DIAGNOSIS — Z13.220 SCREENING FOR HYPERLIPIDEMIA: ICD-10-CM

## 2025-07-22 DIAGNOSIS — Z13.1 SCREENING FOR DIABETES MELLITUS: ICD-10-CM

## 2025-07-22 DIAGNOSIS — R53.83 OTHER FATIGUE: ICD-10-CM

## 2025-07-22 DIAGNOSIS — M25.50 POLYARTHRALGIA: ICD-10-CM

## 2025-07-22 DIAGNOSIS — Z00.00 ANNUAL PHYSICAL EXAM: Primary | ICD-10-CM

## 2025-07-22 PROCEDURE — 99385 PREV VISIT NEW AGE 18-39: CPT | Performed by: INTERNAL MEDICINE

## 2025-07-22 ASSESSMENT — PATIENT HEALTH QUESTIONNAIRE - PHQ9
2. FEELING DOWN, DEPRESSED OR HOPELESS: NOT AT ALL
SUM OF ALL RESPONSES TO PHQ QUESTIONS 1-9: 0
1. LITTLE INTEREST OR PLEASURE IN DOING THINGS: NOT AT ALL
SUM OF ALL RESPONSES TO PHQ QUESTIONS 1-9: 0

## 2025-07-22 ASSESSMENT — ENCOUNTER SYMPTOMS
EYE DISCHARGE: 0
NAUSEA: 0
SHORTNESS OF BREATH: 0
BACK PAIN: 0
BLOOD IN STOOL: 0
DIARRHEA: 0
COUGH: 0
TROUBLE SWALLOWING: 0
CONSTIPATION: 0
SINUS PAIN: 0
CHEST TIGHTNESS: 0
ABDOMINAL PAIN: 0

## 2025-07-22 NOTE — PROGRESS NOTES
Amaya Burgess  Identified pt with two pt identifiers(name and ).  Chief Complaint   Patient presents with    Establish Care    dry mouth        1. Have you been to the ER, urgent care clinic since your last visit?  Hospitalized since your last visit? NO    2. Have you seen or consulted any other health care providers outside of the [facility] since your last visit?  Include any pap smears or colon screening. NO      Provider notified of reason for visit, vitals and flowsheets obtained on patients.     Patient received paperwork for advance directive during previous visit but has not completed at this time     Reviewed record In preparation for visit, huddled with provider and have obtained necessary documentation      Health Maintenance Due   Topic    Varicella vaccine (1 of 2 - 13+ 2-dose series)    Hepatitis B vaccine (1 of 3 - 19+ 3-dose series)    DTaP/Tdap/Td vaccine (1 - Tdap)    COVID-19 Vaccine ( season)       Wt Readings from Last 3 Encounters:   25 54 kg (119 lb)   25 57.9 kg (127 lb 9.6 oz)   24 58.5 kg (129 lb)     Temp Readings from Last 3 Encounters:   25 97.5 °F (36.4 °C) (Oral)   25 98 °F (36.7 °C) (Oral)   24 98.6 °F (37 °C)     BP Readings from Last 3 Encounters:   25 109/70   25 131/76   24 116/74     Pulse Readings from Last 3 Encounters:   25 56   25 68   24 71          No data to display                  Learning Assessment:  :         2025     8:30 AM   [facility] AMB LEARNING ASSESSMENT   Primary Learner Patient   level of education DID NOT GRADUATE HIGH SCHOOL   Primary Language ENGLISH   Learning Preference DEMONSTRATION   Answered By Patient   Relationship to Learner SELF       Fall Risk Assessment:  :          No data to display                Abuse Screening:  :          No data to display                ADL Screening:  :         2025     7:00 AM   ADL ASSESSMENT   Feeding yourself

## 2025-07-22 NOTE — PROGRESS NOTES
Amaya Burgess is a 39 y.o. year old female seen in clinic today for   Chief Complaint   Patient presents with    Establish Care    dry mouth        She  is here today to establish care.  She is originally from [facility] but can speak English well.  Came to [place] in 2017.  She complains of joint pains \"bone pain\" all over.  Present for the last 1 year.  States she did not get a doctor so could not see someone for it.  The pain is bad in the hands she points to the PIP joints, wrists knees.    Current Outpatient Medications   Medication Sig Dispense Refill    omeprazole (PRILOSEC) 20 MG delayed release capsule Take 1 capsule by mouth every morning (before breakfast) 30 capsule 11    vitamin D 25 MCG (1000 UT) CAPS Take 1 capsule by mouth daily       No current facility-administered medications for this visit.        No Known Allergies    Past Medical History:   Diagnosis Date    Acid indigestion     IUD (intrauterine device) in place     Mirena inserted 2017      Past Surgical History:   Procedure Laterality Date    GYN      x3 vaginal deliveries      Family History   Problem Relation Age of Onset    Hypertension Father     Cancer Mother     Hypertension Mother       Social History     Socioeconomic History    Marital status:      Spouse name: None    Number of children: None    Years of education: None    Highest education level: None   Tobacco Use    Smoking status: Never    Smokeless tobacco: Never   Vaping Use    Vaping status: Never Used   Substance and Sexual Activity    Alcohol use: No    Drug use: No    Sexual activity: Yes     Partners: Male     Birth control/protection: I.U.D.     Social Drivers of Health     Financial Resource Strain: Low Risk  (4/22/2024)    Overall Financial Resource Strain (CARDIA)     Difficulty of Paying Living Expenses: Not very hard   Food Insecurity: No Food Insecurity (1/27/2025)    Hunger Vital Sign     Worried About Running Out of Food in the Last Year: Never true

## 2025-07-23 LAB
25(OH)D3 SERPL-MCNC: 39.2 NG/ML (ref 30–100)
ALBUMIN SERPL-MCNC: 4 G/DL (ref 3.5–5)
ALBUMIN/GLOB SERPL: 1.3 (ref 1.1–2.2)
ALP SERPL-CCNC: 59 U/L (ref 45–117)
ALT SERPL-CCNC: 42 U/L (ref 12–78)
ANION GAP SERPL CALC-SCNC: 5 MMOL/L (ref 2–12)
AST SERPL-CCNC: 25 U/L (ref 15–37)
BASOPHILS # BLD: 0.02 K/UL (ref 0–0.1)
BASOPHILS NFR BLD: 0.6 % (ref 0–1)
BILIRUB SERPL-MCNC: 0.4 MG/DL (ref 0.2–1)
BUN SERPL-MCNC: 13 MG/DL (ref 6–20)
BUN/CREAT SERPL: 21 (ref 12–20)
CALCIUM SERPL-MCNC: 9.1 MG/DL (ref 8.5–10.1)
CHLORIDE SERPL-SCNC: 106 MMOL/L (ref 97–108)
CHOLEST SERPL-MCNC: 158 MG/DL
CO2 SERPL-SCNC: 29 MMOL/L (ref 21–32)
CREAT SERPL-MCNC: 0.63 MG/DL (ref 0.55–1.02)
CRP SERPL HS-MCNC: <0.2 MG/L
DIFFERENTIAL METHOD BLD: ABNORMAL
EOSINOPHIL # BLD: 0.11 K/UL (ref 0–0.4)
EOSINOPHIL NFR BLD: 3.5 % (ref 0–7)
ERYTHROCYTE [DISTWIDTH] IN BLOOD BY AUTOMATED COUNT: 11.9 % (ref 11.5–14.5)
ERYTHROCYTE [SEDIMENTATION RATE] IN BLOOD: 9 MM/HR (ref 0–20)
EST. AVERAGE GLUCOSE BLD GHB EST-MCNC: 91 MG/DL
GLOBULIN SER CALC-MCNC: 3.1 G/DL (ref 2–4)
GLUCOSE SERPL-MCNC: 90 MG/DL (ref 65–100)
HBA1C MFR BLD: 4.8 % (ref 4–5.6)
HCT VFR BLD AUTO: 40.8 % (ref 35–47)
HDLC SERPL-MCNC: 45 MG/DL
HDLC SERPL: 3.5 (ref 0–5)
HGB BLD-MCNC: 13.3 G/DL (ref 11.5–16)
IMM GRANULOCYTES # BLD AUTO: 0 K/UL (ref 0–0.04)
IMM GRANULOCYTES NFR BLD AUTO: 0 % (ref 0–0.5)
LDLC SERPL CALC-MCNC: 90.8 MG/DL (ref 0–100)
LYMPHOCYTES # BLD: 0.87 K/UL (ref 0.8–3.5)
LYMPHOCYTES NFR BLD: 27.7 % (ref 12–49)
MCH RBC QN AUTO: 29.8 PG (ref 26–34)
MCHC RBC AUTO-ENTMCNC: 32.6 G/DL (ref 30–36.5)
MCV RBC AUTO: 91.3 FL (ref 80–99)
MONOCYTES # BLD: 0.15 K/UL (ref 0–1)
MONOCYTES NFR BLD: 4.8 % (ref 5–13)
NEUTS SEG # BLD: 1.99 K/UL (ref 1.8–8)
NEUTS SEG NFR BLD: 63.4 % (ref 32–75)
NRBC # BLD: 0 K/UL (ref 0–0.01)
NRBC BLD-RTO: 0 PER 100 WBC
PLATELET # BLD AUTO: 229 K/UL (ref 150–400)
PMV BLD AUTO: 11.3 FL (ref 8.9–12.9)
POTASSIUM SERPL-SCNC: 4 MMOL/L (ref 3.5–5.1)
PROT SERPL-MCNC: 7.1 G/DL (ref 6.4–8.2)
RBC # BLD AUTO: 4.47 M/UL (ref 3.8–5.2)
SODIUM SERPL-SCNC: 140 MMOL/L (ref 136–145)
TRIGL SERPL-MCNC: 111 MG/DL
TSH SERPL DL<=0.05 MIU/L-ACNC: 1.5 UIU/ML (ref 0.36–3.74)
VLDLC SERPL CALC-MCNC: 22.2 MG/DL
WBC # BLD AUTO: 3.1 K/UL (ref 3.6–11)

## 2025-07-24 LAB
ANA SER QL: POSITIVE
CENTROMERE B AB SER-ACNC: <0.2 AI (ref 0–0.9)
CHROMATIN AB SERPL-ACNC: <0.2 AI (ref 0–0.9)
DSDNA AB SER-ACNC: 1 IU/ML (ref 0–9)
ENA JO1 AB SER-ACNC: <0.2 AI (ref 0–0.9)
ENA RNP AB SER-ACNC: 1.6 AI (ref 0–0.9)
ENA SCL70 AB SER-ACNC: <0.2 AI (ref 0–0.9)
ENA SM AB SER-ACNC: <0.2 AI (ref 0–0.9)
ENA SS-A AB SER-ACNC: 0.4 AI (ref 0–0.9)
ENA SS-B AB SER-ACNC: <0.2 AI (ref 0–0.9)
Lab: ABNORMAL

## 2025-08-01 LAB
14-3-3 ETA AG SER IA-MCNC: <0.2 NG/ML
CCP IGA+IGG SERPL IA-ACNC: <20 UNITS
RHEUMATOID FACT SERPL-ACNC: <14 UNITS/ML

## 2025-08-05 ENCOUNTER — TELEPHONE (OUTPATIENT)
Facility: CLINIC | Age: 40
End: 2025-08-05

## 2025-08-05 DIAGNOSIS — D89.89 AUTOIMMUNE DISORDER: Primary | ICD-10-CM

## 2025-08-11 ENCOUNTER — TELEPHONE (OUTPATIENT)
Facility: CLINIC | Age: 40
End: 2025-08-11